# Patient Record
Sex: FEMALE | Race: WHITE | Employment: OTHER | URBAN - METROPOLITAN AREA
[De-identification: names, ages, dates, MRNs, and addresses within clinical notes are randomized per-mention and may not be internally consistent; named-entity substitution may affect disease eponyms.]

---

## 2020-02-24 ENCOUNTER — HOSPITAL ENCOUNTER (EMERGENCY)
Age: 85
Discharge: LEFT BEFORE DISCHARGE | End: 2020-02-24
Attending: EMERGENCY MEDICINE
Payer: MEDICARE

## 2020-02-24 VITALS
DIASTOLIC BLOOD PRESSURE: 94 MMHG | OXYGEN SATURATION: 100 % | SYSTOLIC BLOOD PRESSURE: 153 MMHG | TEMPERATURE: 97.5 F | RESPIRATION RATE: 18 BRPM | WEIGHT: 114.42 LBS | HEART RATE: 76 BPM

## 2020-02-24 DIAGNOSIS — I48.91 ATRIAL FIBRILLATION, UNSPECIFIED TYPE (HCC): Primary | ICD-10-CM

## 2020-02-24 PROCEDURE — 99284 EMERGENCY DEPT VISIT MOD MDM: CPT

## 2020-02-24 PROCEDURE — 93005 ELECTROCARDIOGRAM TRACING: CPT

## 2020-02-25 LAB
ATRIAL RATE: 78 BPM
CALCULATED R AXIS, ECG10: 84 DEGREES
CALCULATED T AXIS, ECG11: -39 DEGREES
DIAGNOSIS, 93000: NORMAL
Q-T INTERVAL, ECG07: 406 MS
QRS DURATION, ECG06: 128 MS
QTC CALCULATION (BEZET), ECG08: 456 MS
VENTRICULAR RATE, ECG03: 76 BPM

## 2020-02-25 NOTE — ED NOTES
Patient very upset stating that she does not want to stay. MD at bedside to explain risks of leaving AMA. Two additional nurses present to witness. Patient still adamant she wants to leave. AMA form explained to patient. Patient refuses to sign. \"I know I could die, but Im not an  and Im not signing. \"

## 2020-02-25 NOTE — DISCHARGE INSTRUCTIONS

## 2020-02-25 NOTE — ED TRIAGE NOTES
Triage Note: Patient arrives with her daughter from [facility] for new onset a-fib and a \"spot\" to her lung. Daughter reports they went to [facility] today for back and left rib pain. Patient uncooperative at this time. Refusing vitals signs and EKG.

## 2020-02-25 NOTE — ED PROVIDER NOTES
Caroline Reynoso is a 69 yo F who was referred from [facility] due to new onset afib. She had gone to [facility] to be evaluated for left lower lateral chest pain that she has had for several weeks. She states that in addition to an  EKG they also did an XR and she was told she had a spot on her lung. She is very upset by what she feels was mistreatment at [facility] because she felt these tests were not necessary as they do not relate to her pain. Patient states that she has been under a lot of stress lately because her  has had multiple myeloma and she has been caring for him. She does not want to be here and does not believe she needs any more testing. PAtient also arguing with her daughter who is at bedside and had brought her to the ED. Patient has not felt heart racing or palpitations           History reviewed. No pertinent past medical history. Past Surgical History:   Procedure Laterality Date    HX APPENDECTOMY      HX TONSIL AND ADENOIDECTOMY           History reviewed. No pertinent family history.     Social History     Socioeconomic History    Marital status:      Spouse name: Not on file    Number of children: Not on file    Years of education: Not on file    Highest education level: Not on file   Occupational History    Not on file   Social Needs    Financial resource strain: Not on file    Food insecurity:     Worry: Not on file     Inability: Not on file    Transportation needs:     Medical: Not on file     Non-medical: Not on file   Tobacco Use    Smoking status: Never Smoker    Smokeless tobacco: Never Used   Substance and Sexual Activity    Alcohol use: Yes     Frequency: Never     Comment: occ    Drug use: Not Currently    Sexual activity: Not on file   Lifestyle    Physical activity:     Days per week: Not on file     Minutes per session: Not on file    Stress: Not on file   Relationships    Social connections:     Talks on phone: Not on file Gets together: Not on file     Attends Mandaen service: Not on file     Active member of club or organization: Not on file     Attends meetings of clubs or organizations: Not on file     Relationship status: Not on file    Intimate partner violence:     Fear of current or ex partner: Not on file     Emotionally abused: Not on file     Physically abused: Not on file     Forced sexual activity: Not on file   Other Topics Concern    Not on file   Social History Narrative    Not on file         ALLERGIES: Patient has no known allergies. Review of Systems   Constitutional: Negative for fever. HENT: Negative for sore throat. Eyes: Negative for visual disturbance. Respiratory: Negative for cough. Cardiovascular: Positive for chest pain (left lateral chest wall). Negative for palpitations. Gastrointestinal: Negative for abdominal pain. Genitourinary: Negative for dysuria. Musculoskeletal: Positive for back pain. Skin: Negative for rash. Neurological: Negative for headaches. Vitals:    02/1935   BP: (!) 153/94   Pulse: 76   Resp: 18   Temp: 97.5 °F (36.4 °C)   SpO2: 100%   Weight: 51.9 kg (114 lb 6.7 oz)            Physical Exam  Vitals signs and nursing note reviewed. Constitutional:       General: She is not in acute distress. Appearance: She is well-developed. HENT:      Head: Normocephalic and atraumatic. Eyes:      Conjunctiva/sclera: Conjunctivae normal.   Neck:      Musculoskeletal: Normal range of motion. Trachea: Phonation normal.   Cardiovascular:      Rate and Rhythm: Normal rate. Rhythm irregular. Pulmonary:      Effort: Pulmonary effort is normal. No respiratory distress. Breath sounds: No wheezing or rales. Chest:      Chest wall: Tenderness (left lateral inferior chest wall) and crepitus present. Abdominal:      General: There is no distension. Musculoskeletal: Normal range of motion. General: No tenderness.    Skin:     General: Skin is warm and dry. Neurological:      Mental Status: She is alert. She is not disoriented. Motor: No abnormal muscle tone. Psychiatric:         Mood and Affect: Affect is angry. MDM     Patient refuses all testing and treatment at this time. Advised patient of risks of cardiac injury, stroke and death if her atrial fibrillation is not addressed. Patient voices understanding of risk. Advised that she can return to the ED at anytime if she changes her mind.     Procedures

## 2020-03-26 ENCOUNTER — HOSPITAL ENCOUNTER (OUTPATIENT)
Dept: MRI IMAGING | Age: 85
Discharge: HOME | End: 2020-03-26
Attending: ORTHOPAEDIC SURGERY
Payer: MEDICARE

## 2020-03-26 DIAGNOSIS — M47.817 LUMBOSACRAL SPONDYLOSIS WITHOUT MYELOPATHY: ICD-10-CM

## 2020-03-26 DIAGNOSIS — M43.10 ACQUIRED SPONDYLOLISTHESIS: ICD-10-CM

## 2020-03-26 DIAGNOSIS — S32.000S COMPRESSION FRACTURE OF LUMBAR VERTEBRA, UNSPECIFIED LUMBAR VERTEBRAL LEVEL, SEQUELA: ICD-10-CM

## 2020-03-26 DIAGNOSIS — M47.814 THORACIC SPONDYLOSIS WITHOUT MYELOPATHY: ICD-10-CM

## 2020-03-26 DIAGNOSIS — S22.000S COMPRESSION FRACTURE OF THORACIC VERTEBRA, UNSPECIFIED THORACIC VERTEBRAL LEVEL, SEQUELA: ICD-10-CM

## 2020-03-26 DIAGNOSIS — M41.50 DEGENERATIVE SCOLIOSIS IN ADULT PATIENT: ICD-10-CM

## 2020-03-26 DIAGNOSIS — M54.50 LOW BACK PAIN, UNSPECIFIED BACK PAIN LATERALITY, UNSPECIFIED CHRONICITY, UNSPECIFIED WHETHER SCIATICA PRESENT: ICD-10-CM

## 2020-03-26 DIAGNOSIS — M54.6 PAIN IN THORACIC SPINE: ICD-10-CM

## 2020-03-26 PROCEDURE — 72146 MRI CHEST SPINE W/O DYE: CPT

## 2020-03-26 PROCEDURE — 72148 MRI LUMBAR SPINE W/O DYE: CPT

## 2022-01-24 ENCOUNTER — HOSPITAL ENCOUNTER (INPATIENT)
Age: 87
LOS: 5 days | Discharge: NURSING, REHAB OR LONG-TERM CARE | DRG: 515 | End: 2022-02-06
Attending: STUDENT IN AN ORGANIZED HEALTH CARE EDUCATION/TRAINING PROGRAM | Admitting: HOSPITALIST
Payer: MEDICARE

## 2022-01-24 ENCOUNTER — APPOINTMENT (OUTPATIENT)
Dept: CT IMAGING | Age: 87
DRG: 515 | End: 2022-01-24
Attending: STUDENT IN AN ORGANIZED HEALTH CARE EDUCATION/TRAINING PROGRAM
Payer: MEDICARE

## 2022-01-24 DIAGNOSIS — I50.9 CONGESTIVE HEART FAILURE, UNSPECIFIED HF CHRONICITY, UNSPECIFIED HEART FAILURE TYPE (HCC): Primary | ICD-10-CM

## 2022-01-24 DIAGNOSIS — S22.000A COMPRESSION FRACTURE OF THORACIC VERTEBRA, UNSPECIFIED THORACIC VERTEBRAL LEVEL, INITIAL ENCOUNTER (HCC): ICD-10-CM

## 2022-01-24 DIAGNOSIS — M54.6 ACUTE THORACIC BACK PAIN, UNSPECIFIED BACK PAIN LATERALITY: ICD-10-CM

## 2022-01-24 LAB
ALBUMIN SERPL-MCNC: 3.8 G/DL (ref 3.5–5)
ALBUMIN/GLOB SERPL: 0.9 {RATIO} (ref 1.1–2.2)
ALP SERPL-CCNC: 136 U/L (ref 45–117)
ALT SERPL-CCNC: 15 U/L (ref 12–78)
ANION GAP SERPL CALC-SCNC: 12 MMOL/L (ref 5–15)
AST SERPL-CCNC: 30 U/L (ref 15–37)
BASOPHILS # BLD: 0 K/UL (ref 0–0.1)
BASOPHILS NFR BLD: 0 % (ref 0–1)
BILIRUB SERPL-MCNC: 1.2 MG/DL (ref 0.2–1)
BNP SERPL-MCNC: 1284 PG/ML (ref 0–450)
BUN SERPL-MCNC: 12 MG/DL (ref 6–20)
BUN/CREAT SERPL: 21 (ref 12–20)
CALCIUM SERPL-MCNC: 9.4 MG/DL (ref 8.5–10.1)
CHLORIDE SERPL-SCNC: 101 MMOL/L (ref 97–108)
CK SERPL-CCNC: 67 U/L (ref 26–192)
CO2 SERPL-SCNC: 28 MMOL/L (ref 21–32)
CREAT SERPL-MCNC: 0.58 MG/DL (ref 0.55–1.02)
DIFFERENTIAL METHOD BLD: ABNORMAL
EOSINOPHIL # BLD: 0.1 K/UL (ref 0–0.4)
EOSINOPHIL NFR BLD: 1 % (ref 0–7)
ERYTHROCYTE [DISTWIDTH] IN BLOOD BY AUTOMATED COUNT: 12.2 % (ref 11.5–14.5)
GLOBULIN SER CALC-MCNC: 4.2 G/DL (ref 2–4)
GLUCOSE SERPL-MCNC: 100 MG/DL (ref 65–100)
HCT VFR BLD AUTO: 48 % (ref 35–47)
HGB BLD-MCNC: 15.7 G/DL (ref 11.5–16)
IMM GRANULOCYTES # BLD AUTO: 0 K/UL (ref 0–0.04)
IMM GRANULOCYTES NFR BLD AUTO: 0 % (ref 0–0.5)
LYMPHOCYTES # BLD: 1 K/UL (ref 0.8–3.5)
LYMPHOCYTES NFR BLD: 10 % (ref 12–49)
MCH RBC QN AUTO: 33.3 PG (ref 26–34)
MCHC RBC AUTO-ENTMCNC: 32.7 G/DL (ref 30–36.5)
MCV RBC AUTO: 101.7 FL (ref 80–99)
MONOCYTES # BLD: 0.6 K/UL (ref 0–1)
MONOCYTES NFR BLD: 5 % (ref 5–13)
NEUTS SEG # BLD: 9.1 K/UL (ref 1.8–8)
NEUTS SEG NFR BLD: 84 % (ref 32–75)
NRBC # BLD: 0 K/UL (ref 0–0.01)
NRBC BLD-RTO: 0 PER 100 WBC
PLATELET # BLD AUTO: 201 K/UL (ref 150–400)
PMV BLD AUTO: 11.4 FL (ref 8.9–12.9)
POTASSIUM SERPL-SCNC: 4.6 MMOL/L (ref 3.5–5.1)
PROT SERPL-MCNC: 8 G/DL (ref 6.4–8.2)
RBC # BLD AUTO: 4.72 M/UL (ref 3.8–5.2)
SODIUM SERPL-SCNC: 141 MMOL/L (ref 136–145)
TROPONIN-HIGH SENSITIVITY: 24 NG/L (ref 0–51)
WBC # BLD AUTO: 10.8 K/UL (ref 3.6–11)

## 2022-01-24 PROCEDURE — 74011000636 HC RX REV CODE- 636: Performed by: STUDENT IN AN ORGANIZED HEALTH CARE EDUCATION/TRAINING PROGRAM

## 2022-01-24 PROCEDURE — 71275 CT ANGIOGRAPHY CHEST: CPT

## 2022-01-24 PROCEDURE — 74177 CT ABD & PELVIS W/CONTRAST: CPT

## 2022-01-24 PROCEDURE — 83880 ASSAY OF NATRIURETIC PEPTIDE: CPT

## 2022-01-24 PROCEDURE — 99285 EMERGENCY DEPT VISIT HI MDM: CPT

## 2022-01-24 PROCEDURE — 80053 COMPREHEN METABOLIC PANEL: CPT

## 2022-01-24 PROCEDURE — 36415 COLL VENOUS BLD VENIPUNCTURE: CPT

## 2022-01-24 PROCEDURE — 93005 ELECTROCARDIOGRAM TRACING: CPT

## 2022-01-24 PROCEDURE — 65270000029 HC RM PRIVATE

## 2022-01-24 PROCEDURE — 65390000012 HC CONDITION CODE 44 OBSERVATION

## 2022-01-24 PROCEDURE — 82550 ASSAY OF CK (CPK): CPT

## 2022-01-24 PROCEDURE — 85025 COMPLETE CBC W/AUTO DIFF WBC: CPT

## 2022-01-24 PROCEDURE — 84484 ASSAY OF TROPONIN QUANT: CPT

## 2022-01-24 PROCEDURE — 94761 N-INVAS EAR/PLS OXIMETRY MLT: CPT

## 2022-01-24 PROCEDURE — 74011250637 HC RX REV CODE- 250/637: Performed by: EMERGENCY MEDICINE

## 2022-01-24 RX ORDER — KETOROLAC TROMETHAMINE 30 MG/ML
15 INJECTION, SOLUTION INTRAMUSCULAR; INTRAVENOUS
Status: DISPENSED | OUTPATIENT
Start: 2022-01-25 | End: 2022-01-25

## 2022-01-24 RX ORDER — ACETAMINOPHEN 500 MG
1000 TABLET ORAL
Status: COMPLETED | OUTPATIENT
Start: 2022-01-25 | End: 2022-01-24

## 2022-01-24 RX ORDER — MORPHINE SULFATE 4 MG/ML
4 INJECTION INTRAVENOUS
Status: DISPENSED | OUTPATIENT
Start: 2022-01-25 | End: 2022-01-25

## 2022-01-24 RX ADMIN — IOPAMIDOL 100 ML: 755 INJECTION, SOLUTION INTRAVENOUS at 19:04

## 2022-01-24 RX ADMIN — ACETAMINOPHEN 500 MG: 500 TABLET ORAL at 23:39

## 2022-01-24 NOTE — ED TRIAGE NOTES
Pt arrived to ED via wheelchair. Pt reports chief complaint of back pain upon arrival. Daughter present in triage room for triage. Pt reports back pain started this am. Daughter volunteers that patient expressed that she was having back pain yesterday and has been complaining of intermittent back pain since November. Upon asking orientation questions during triage, pt gave different birthday that arrived in ED. Daughter and patient report that patient gives different birthdays at times because \"people do not need to know how old she really is. \" Registrar verified patient date of birth with drivers license and chart changed to reflect actual birthday. Pt agitated with triage questions in triage room stating that \"you just think I am crazy and are trying to send me to the [facility] bin. \" RN and daughter reassured patient that RN is trying to help patient and understand patient history and timeline of events that brought her in today. Pt reports she has been taking another family members muscle relaxers prior to coming to ED last night. ED MD made aware.

## 2022-01-24 NOTE — ED PROVIDER NOTES
HPI     Patient is an 44-year-old female who presents today with her daughter for back pain. Daughter says that patient had back pain earlier today. She had difficulty walking and was short of breath. EMS had to help her out of the house. She denies any trauma. Says she has a history of fractures in the past.  She also has been feeling winded and short of breath, worse with taking deep breaths. She was brought in with her daughter for further evaluation. Past Medical History:   Diagnosis Date    Atrial fibrillation, chronic (HCC)     Back pain     Sciatica     Spondylolysis        Past Surgical History:   Procedure Laterality Date    HX APPENDECTOMY      HX TONSIL AND ADENOIDECTOMY           History reviewed. No pertinent family history. Social History     Socioeconomic History    Marital status:      Spouse name: Not on file    Number of children: Not on file    Years of education: Not on file    Highest education level: Not on file   Occupational History    Not on file   Tobacco Use    Smoking status: Never Smoker    Smokeless tobacco: Never Used   Substance and Sexual Activity    Alcohol use: Yes     Comment: occ    Drug use: Not Currently    Sexual activity: Not on file   Other Topics Concern    Not on file   Social History Narrative    Not on file     Social Determinants of Health     Financial Resource Strain:     Difficulty of Paying Living Expenses: Not on file   Food Insecurity:     Worried About Running Out of Food in the Last Year: Not on file    Sandy of Food in the Last Year: Not on file   Transportation Needs:     Lack of Transportation (Medical): Not on file    Lack of Transportation (Non-Medical):  Not on file   Physical Activity:     Days of Exercise per Week: Not on file    Minutes of Exercise per Session: Not on file   Stress:     Feeling of Stress : Not on file   Social Connections:     Frequency of Communication with Friends and Family: Not on file  Frequency of Social Gatherings with Friends and Family: Not on file    Attends Sikh Services: Not on file    Active Member of Clubs or Organizations: Not on file    Attends Club or Organization Meetings: Not on file    Marital Status: Not on file   Intimate Partner Violence:     Fear of Current or Ex-Partner: Not on file    Emotionally Abused: Not on file    Physically Abused: Not on file    Sexually Abused: Not on file   Housing Stability:     Unable to Pay for Housing in the Last Year: Not on file    Number of [place] in the Last Year: Not on file    Unstable Housing in the Last Year: Not on file         ALLERGIES: Patient has no known allergies. Review of Systems   Constitutional: Negative for appetite change and fever. HENT: Negative for congestion and rhinorrhea. Eyes: Negative for discharge and redness. Respiratory: Positive for shortness of breath. Negative for cough. Cardiovascular: Negative for chest pain. Gastrointestinal: Negative for abdominal pain, diarrhea, nausea and vomiting. Genitourinary: Negative for decreased urine volume and dysuria. Musculoskeletal: Positive for back pain. Skin: Negative for rash and wound. Neurological: Negative for seizures and headaches. Hematological: Does not bruise/bleed easily. Psychiatric/Behavioral: Negative for agitation. All other systems reviewed and are negative. Vitals:    01/26/22 0420 01/26/22 0558 01/26/22 0900 01/26/22 1000   BP: (!) 187/96  (!) 166/97    Pulse: 75 61 69 83   Resp: 22  21    Temp: 97.2 °F (36.2 °C)  97.5 °F (36.4 °C)    SpO2: 94%  96%    Weight:       Height:                Physical Exam  Vitals and nursing note reviewed. Constitutional:       General: She is not in acute distress. Appearance: Normal appearance. HENT:      Head: Normocephalic and atraumatic. Nose: Nose normal.      Mouth/Throat:      Mouth: Mucous membranes are moist.      Pharynx: Oropharynx is clear. Eyes:      Extraocular Movements: Extraocular movements intact. Conjunctiva/sclera: Conjunctivae normal.      Pupils: Pupils are equal, round, and reactive to light. Cardiovascular:      Rate and Rhythm: Normal rate and regular rhythm. Pulses: Normal pulses. Heart sounds: Normal heart sounds. No murmur heard. No friction rub. No gallop. Pulmonary:      Effort: Pulmonary effort is normal.      Breath sounds: Normal breath sounds. Comments: Short of breath when taking steps or speaking. Abdominal:      General: Bowel sounds are normal. There is no distension. Palpations: Abdomen is soft. Tenderness: There is no abdominal tenderness. Musculoskeletal:         General: Normal range of motion. Cervical back: Normal range of motion. Comments: No bony tenderness present on the cervical, thoracic, or lumbar spine. Skin:     General: Skin is warm and dry. Capillary Refill: Capillary refill takes less than 2 seconds. Neurological:      General: No focal deficit present. Mental Status: She is alert and oriented to person, place, and time. Mental status is at baseline. Psychiatric:         Mood and Affect: Mood normal.          MDM            MEDICAL DECISION MAKIN y.o. female presents with Back Pain    Differential diagnosis includes but not limited to:  Fracture vs muskoculosketal strain vs chest pain    Perfect [facility] Consult for Admission  8:34 PM    ED Room Number: S637/01  Patient Name and age:  Xiomara Vance 80 y.o.  female  Working Diagnosis:   1. Congestive heart failure, unspecified HF chronicity, unspecified heart failure type ([facility] [address]    2. Acute thoracic back pain, unspecified back pain laterality    3.  Compression fracture of thoracic vertebra, unspecified thoracic vertebral level, initial encounter ([facility] [address]        COVID-19 Suspicion:  no  Sepsis present:  no  Reassessment needed: no  Code Status:  Full Code  Readmission: no  Isolation Requirements: no  Recommended Level of Care:  med/surg  Department:[facility] Adult ED - [phone]  Other: Patient is a an 51-year-old female with history of compression fractures of T11, T12, L4 who presents today for shortness of breath and severe back pain while at home today. On exam, patient is short of breath with exertion and also when communicating with me. Labs reviewed with elevated BNP starting for new heart failure. CTA ordered and reviewed. Cardiomegaly. Multiple thoracic compression fractures, stable. Patient given pain medication while in the ED. Patient is stable for the floor. LABORATORY TESTS:  Labs Reviewed   CBC WITH AUTOMATED DIFF - Abnormal; Notable for the following components:       Result Value    HCT 48.0 (*)     .7 (*)     NEUTROPHILS 84 (*)     LYMPHOCYTES 10 (*)     ABS. NEUTROPHILS 9.1 (*)     All other components within normal limits   METABOLIC PANEL, COMPREHENSIVE - Abnormal; Notable for the following components:    BUN/Creatinine ratio 21 (*)     Bilirubin, total 1.2 (*)     Alk. phosphatase 136 (*)     Globulin 4.2 (*)     A-G Ratio 0.9 (*)     All other components within normal limits   NT-PRO BNP - Abnormal; Notable for the following components:    NT pro-BNP 1,284 (*)     All other components within normal limits   TROPONIN-HIGH SENSITIVITY   CK   TROPONIN-HIGH SENSITIVITY   SAMPLES BEING HELD   TROPONIN-HIGH SENSITIVITY   METABOLIC PANEL, BASIC   CBC WITH AUTOMATED DIFF       IMAGING RESULTS:  CTA CHEST W OR W WO CONT   Final Result      No evidence for aortic dissection. Cardiomegaly   Multiple thoracic compression fractures, stable      CT ABD PELV W CONT   Final Result   No acute abnormality in the abdomen or pelvis.        MRI [facility] SPINE WO CONT    (Results Pending)   CT HEAD WO CONT    (Results Pending)       MEDICATIONS GIVEN:  Medications   morphine injection 4 mg (4 mg IntraVENous Refused 1/25/22 0000)   ketorolac (TORADOL) injection 15 mg (15 mg IntraVENous Refused 1/25/22 0000)   sodium chloride (NS) flush 5-40 mL ( IntraVENous Canceled Entry 1/26/22 0600)   sodium chloride (NS) flush 5-40 mL (has no administration in time range)   acetaminophen (TYLENOL) tablet 650 mg (has no administration in time range)   morphine injection 1 mg (has no administration in time range)   heparin (porcine) injection 5,000 Units (5,000 Units SubCUTAneous Refused 1/26/22 0435)   hydrALAZINE (APRESOLINE) 20 mg/mL injection 10 mg (has no administration in time range)   LORazepam (ATIVAN) injection 0.5 mg (0.5 mg IntraMUSCular Refused 1/25/22 1707)   LORazepam (ATIVAN) injection 1 mg (has no administration in time range)   iopamidoL (ISOVUE-370) 76 % injection 100 mL (100 mL IntraVENous Given 1/24/22 1904)   acetaminophen (TYLENOL) tablet 1,000 mg (500 mg Oral Given 1/24/22 2339)   furosemide (LASIX) injection 20 mg (20 mg IntraVENous Given 1/25/22 1157)   LORazepam (ATIVAN) tablet 1 mg (1 mg Oral Given 1/25/22 1949)     IMPRESSION:  1. Congestive heart failure, unspecified HF chronicity, unspecified heart failure type ([address]    2. Acute thoracic back pain, unspecified back pain laterality    3.  Compression fracture of thoracic vertebra, unspecified thoracic vertebral level, initial encounter [facility])        PLAN:  - Admit to hospitalist    Porsha Lerner MD              Procedures

## 2022-01-25 ENCOUNTER — APPOINTMENT (OUTPATIENT)
Dept: NON INVASIVE DIAGNOSTICS | Age: 87
DRG: 515 | End: 2022-01-25
Attending: FAMILY MEDICINE
Payer: MEDICARE

## 2022-01-25 ENCOUNTER — APPOINTMENT (OUTPATIENT)
Dept: CT IMAGING | Age: 87
DRG: 515 | End: 2022-01-25
Attending: FAMILY MEDICINE
Payer: MEDICARE

## 2022-01-25 LAB
ATRIAL RATE: 72 BPM
CALCULATED R AXIS, ECG10: 100 DEGREES
CALCULATED T AXIS, ECG11: -62 DEGREES
DIAGNOSIS, 93000: NORMAL
Q-T INTERVAL, ECG07: 408 MS
QRS DURATION, ECG06: 134 MS
QTC CALCULATION (BEZET), ECG08: 459 MS
TROPONIN-HIGH SENSITIVITY: 27 NG/L (ref 0–51)
VENTRICULAR RATE, ECG03: 76 BPM

## 2022-01-25 PROCEDURE — 36415 COLL VENOUS BLD VENIPUNCTURE: CPT

## 2022-01-25 PROCEDURE — 84484 ASSAY OF TROPONIN QUANT: CPT

## 2022-01-25 PROCEDURE — 96372 THER/PROPH/DIAG INJ SC/IM: CPT

## 2022-01-25 PROCEDURE — 74011250636 HC RX REV CODE- 250/636: Performed by: FAMILY MEDICINE

## 2022-01-25 PROCEDURE — 94761 N-INVAS EAR/PLS OXIMETRY MLT: CPT

## 2022-01-25 PROCEDURE — 65660000000 HC RM CCU STEPDOWN

## 2022-01-25 PROCEDURE — 94760 N-INVAS EAR/PLS OXIMETRY 1: CPT

## 2022-01-25 PROCEDURE — 65390000012 HC CONDITION CODE 44 OBSERVATION

## 2022-01-25 PROCEDURE — 74011250637 HC RX REV CODE- 250/637: Performed by: FAMILY MEDICINE

## 2022-01-25 PROCEDURE — 96374 THER/PROPH/DIAG INJ IV PUSH: CPT

## 2022-01-25 PROCEDURE — 65210000002 HC RM PRIVATE GYN

## 2022-01-25 PROCEDURE — 93306 TTE W/DOPPLER COMPLETE: CPT

## 2022-01-25 RX ORDER — ACETAMINOPHEN 325 MG/1
650 TABLET ORAL
Status: DISCONTINUED | OUTPATIENT
Start: 2022-01-25 | End: 2022-02-06 | Stop reason: HOSPADM

## 2022-01-25 RX ORDER — LORAZEPAM 1 MG/1
1 TABLET ORAL ONCE
Status: COMPLETED | OUTPATIENT
Start: 2022-01-25 | End: 2022-01-25

## 2022-01-25 RX ORDER — HEPARIN SODIUM 5000 [USP'U]/ML
5000 INJECTION, SOLUTION INTRAVENOUS; SUBCUTANEOUS EVERY 8 HOURS
Status: DISCONTINUED | OUTPATIENT
Start: 2022-01-25 | End: 2022-02-06 | Stop reason: HOSPADM

## 2022-01-25 RX ORDER — SODIUM CHLORIDE 0.9 % (FLUSH) 0.9 %
5-40 SYRINGE (ML) INJECTION EVERY 8 HOURS
Status: DISCONTINUED | OUTPATIENT
Start: 2022-01-25 | End: 2022-02-06 | Stop reason: HOSPADM

## 2022-01-25 RX ORDER — LORAZEPAM 2 MG/ML
0.5 INJECTION INTRAMUSCULAR ONCE
Status: DISPENSED | OUTPATIENT
Start: 2022-01-25 | End: 2022-01-26

## 2022-01-25 RX ORDER — HYDRALAZINE HYDROCHLORIDE 20 MG/ML
10 INJECTION INTRAMUSCULAR; INTRAVENOUS
Status: DISCONTINUED | OUTPATIENT
Start: 2022-01-25 | End: 2022-02-06 | Stop reason: HOSPADM

## 2022-01-25 RX ORDER — SODIUM CHLORIDE 0.9 % (FLUSH) 0.9 %
5-40 SYRINGE (ML) INJECTION AS NEEDED
Status: DISCONTINUED | OUTPATIENT
Start: 2022-01-25 | End: 2022-02-06 | Stop reason: HOSPADM

## 2022-01-25 RX ORDER — FUROSEMIDE 10 MG/ML
20 INJECTION INTRAMUSCULAR; INTRAVENOUS ONCE
Status: COMPLETED | OUTPATIENT
Start: 2022-01-25 | End: 2022-01-25

## 2022-01-25 RX ORDER — MORPHINE SULFATE 2 MG/ML
1 INJECTION, SOLUTION INTRAMUSCULAR; INTRAVENOUS
Status: DISCONTINUED | OUTPATIENT
Start: 2022-01-25 | End: 2022-02-06 | Stop reason: HOSPADM

## 2022-01-25 RX ADMIN — LORAZEPAM 1 MG: 1 TABLET ORAL at 19:49

## 2022-01-25 RX ADMIN — HEPARIN SODIUM 5000 UNITS: 5000 INJECTION INTRAVENOUS; SUBCUTANEOUS at 21:12

## 2022-01-25 RX ADMIN — HEPARIN SODIUM 5000 UNITS: 5000 INJECTION INTRAVENOUS; SUBCUTANEOUS at 11:56

## 2022-01-25 RX ADMIN — FUROSEMIDE 20 MG: 10 INJECTION, SOLUTION INTRAVENOUS at 11:57

## 2022-01-25 NOTE — PROGRESS NOTES
Physical Therapy Note:  Chart reviewed in preparation for evaluation. Patient with c/o back pain with multiple thoracic compression fx identified. Patient currently on bed rest and awaiting IR to evaluate for a potential kyphoplasty. Will defer and follow up once cleared for OOB mobility.   Thank you,  Omar Mohr, PT, DPT

## 2022-01-25 NOTE — CONSULTS
[facility] Cardiology Consultation    Date of Consult:  01/25/22  Date of Admission: 1/24/2022  Primary Cardiologist: none  Physician Requesting consult: Dr. Leanne Batista    Assessment/Plan:  - Possible heart failure - history limited due to likely dementia but daughter noted exertional dyspnea. NT pro BNP elevated. No overt signs of volume overload on exam. CT without pulmonary edema. Will follow up echo. - Atrial fibrillation - rate controlled. Noted on ECG one year ago as well. Daughter unaware of this history. CHADSVASc at least 3 but not a good anticoagulation candidate given her age, frailty, and possible dementia. Discussed this with daughter and she agrees with deferring anticoagulation  - Thoracic compression fractures - could potentially be contributing to her dyspnea as well. Thank you for the opportunity to participate in the care of Kellee Allen and please do not hesitate to contact us should you have any questions. Chief Complaint / Reason for Consult:   Possible HF    History of Present Illness:  Kellee Allen is a 80 y.o. female with the below listed medical history who was admitted with back pain. The patient was unable to provide much of a history. She is alert but likely has dementia. She noted having upper back pain recently. Denies chest pain or dyspnea. Per daughter, patient lives her . She does not go to see doctors regularly but does not like to take medications. EMS was called because she was having severe back pain. She had also noted that she was becoming short of breath with exertion. She does not have know cardiac history. She was seen in ER 2/2020 for atrial fibrillation and had refused testing/treatment for it at the time. Work up notable for elevated NT pro BNP of 1284. Hs Tn 24 --> 27.  ECG with AF, v rate 70s, RBBB, and inferior T wave inversions unchanged from prior    Past Medical History:   Diagnosis Date    Atrial fibrillation, chronic (HCC)     Back pain     Sciatica     Spondylolysis        Prior to Admission medications    Not on File       Current Facility-Administered Medications   Medication Dose Route Frequency    sodium chloride (NS) flush 5-40 mL  5-40 mL IntraVENous Q8H    sodium chloride (NS) flush 5-40 mL  5-40 mL IntraVENous PRN    acetaminophen (TYLENOL) tablet 650 mg  650 mg Oral Q4H PRN    morphine injection 1 mg  1 mg IntraVENous Q4H PRN    heparin (porcine) injection 5,000 Units  5,000 Units SubCUTAneous Q8H    hydrALAZINE (APRESOLINE) 20 mg/mL injection 10 mg  10 mg IntraVENous Q6H PRN       History reviewed. No pertinent family history. Social History     Socioeconomic History    Marital status:      Spouse name: Not on file    Number of children: Not on file    Years of education: Not on file    Highest education level: Not on file   Occupational History    Not on file   Tobacco Use    Smoking status: Never Smoker    Smokeless tobacco: Never Used   Substance and Sexual Activity    Alcohol use: Yes     Comment: occ    Drug use: Not Currently    Sexual activity: Not on file   Other Topics Concern    Not on file   Social History Narrative    Not on file     Social Determinants of Health     Financial Resource Strain:     Difficulty of Paying Living Expenses: Not on file   Food Insecurity:     Worried About Running Out of Food in the Last Year: Not on file    Sandy of Food in the Last Year: Not on file   Transportation Needs:     Lack of Transportation (Medical): Not on file    Lack of Transportation (Non-Medical):  Not on file   Physical Activity:     Days of Exercise per Week: Not on file    Minutes of Exercise per Session: Not on file   Stress:     Feeling of Stress : Not on file   Social Connections:     Frequency of Communication with Friends and Family: Not on file    Frequency of Social Gatherings with Friends and Family: Not on file    Attends Sabianism Services: Not on file   [employer] of Clubs or Organizations: Not on file    Attends Club or Organization Meetings: Not on file    Marital Status: Not on file   Intimate Partner Violence:     Fear of Current or Ex-Partner: Not on file    Emotionally Abused: Not on file    Physically Abused: Not on file    Sexually Abused: Not on file   Housing Stability:     Unable to Pay for Housing in the Last Year: Not on file    Number of [place] crist[place] in the Last Year: Not on file    Unstable Housing in the Last Year: Not on file       ROS  ROS: All other systems reviewed and were negative other than mentioned above.      Visit Vitals  BP (!) 145/77   Pulse 67   Temp 98.1 °F (36.7 °C)   Resp 24   Ht 5' 7\" (1.702 m)   Wt 51.7 kg (114 lb)   SpO2 97%   BMI 17.85 kg/m²       No intake or output data in the 24 hours ending 01/25/22 1441     General: resting comfortably in no distress, elderly, frail  HEENT: sclera anicteric, moist mucous membranes  Neck: supple, no JVD  CV: irregular rate and rhythm, no murmurs, rubs or gallops  Lungs: no respiratory distress, lungs clear to auscultation   Abdomen: soft, non distended, non tender  MSK: no lower extremity edema  Skin: warm to touch  Neuro: alert  Psych: normal mood and affect     Lab Review:  BMP:   Lab Results   Component Value Date/Time     01/24/2022 05:22 PM    K 4.6 01/24/2022 05:22 PM     01/24/2022 05:22 PM    CO2 28 01/24/2022 05:22 PM    AGAP 12 01/24/2022 05:22 PM     01/24/2022 05:22 PM    BUN 12 01/24/2022 05:22 PM    CREA 0.58 01/24/2022 05:22 PM    GFRAA >60 01/24/2022 05:22 PM    GFRNA >60 01/24/2022 05:22 PM        CBC:  Lab Results   Component Value Date/Time    WBC 10.8 01/24/2022 05:22 PM    HGB 15.7 01/24/2022 05:22 PM    HCT 48.0 (H) 01/24/2022 05:22 PM    PLATELET 807 69/72/4682 05:22 PM    .7 (H) 01/24/2022 05:22 PM       All Cardiac Markers in the last 24 hours:    Lab Results   Component Value Date/Time    CPK 67 01/24/2022 05:22 PM    BNPNT 1,284 (H) 01/24/2022 05:22 PM No results found for: CHOL, CHOLPOCT, CHOLX, CHLST, CHOLV, HDL, HDLPOC, HDLP, LDL, LDLCPOC, LDLC, DLDLP, VLDLC, VLDL, TGLX, TRIGL, TRIGP, TGLPOCT, CHHD, CHHDX     Data Review:  ECG tracing personally reviewed: ECG with AF, v rate 70s, RBBB, and inferior T wave inversions unchanged from prior    Echocardiogram: pending      Signed:  Brian Chan, [address] [place], [place] [id number]  01/25/22

## 2022-01-25 NOTE — H&P
[address] [facility] Adult  Hospitalist Group  History and Physical    Date of Service:  1/25/2022  Primary Care Provider: Bertha Mauricio MD  Source of information: The patient    Chief Complaint: Back Pain      History of Presenting Illness:   Marian Kumar is a 80 y.o. female who presents with with back pain,    History was primarily obtained from the patient's daughter, patient is alert, is somewhat confused, suspect underlying dementia, patient apparently presented with back pain that started a few days back, patient reports that she is not sure whether she had a fall or not but as far she remembers she has not had any falls or injuries, patient reports that it is hard for her to walk because she continues to have significant pain, came to the ER yesterday, who was found to have multiple thoracic compression fractures, the daughter reports that she has had history of thoracic compression fractures in the past, the daughter also reports that patient is having some shortness of breath on exertion, no fever chills, has had received vaccine focal    The patient denies any headache, blurry vision, sore throat, trouble swallowing, trouble with speech, chest pain,, cough, fever, chills, N/V/D, abd pain, urinary symptoms, constipation, recent travels, sick contacts, focal or generalized neurological symptoms, falls, injuries, rashes, contact with COVID-19 diagnosed patients, hematemesis, melena, hemoptysis, hematuria, rashes, denies starting any new medications and denies any other concerns or problems besides as mentioned above. REVIEW OF SYSTEMS:  A comprehensive review of systems was negative except for that written in the History of Present Illness.      Past Medical History:   Diagnosis Date    Atrial fibrillation, chronic (HCC)     Back pain     Sciatica     Spondylolysis       Past Surgical History:   Procedure Laterality Date    HX APPENDECTOMY      HX TONSIL AND ADENOIDECTOMY       Prior to Admission medications    Not on File     No Known Allergies   History reviewed. No pertinent family history. Social History:  reports that she has never smoked. She has never used smokeless tobacco. She reports current alcohol use. She reports previous drug use. Family and social history were personally reviewed, all pertinent and relevant details are outlined as above. Objective:     Visit Vitals  BP (!) 152/83   Pulse 62   Temp 97.8 °F (36.6 °C)   Resp 21   SpO2 94%      O2 Device: None (Room air)    PHYSICAL EXAM:   General: Alert x oriented x somewhat confused  HEENT: PEERL, moist mucus membranes  Neck: Supple, no JVD, no meningeal signs  Chest: Clear to auscultation bilaterally   CVS: S1-S2 heard  Abd: Soft, non-tender, non-distended, +bowel sounds   Ext: No clubbing, no cyanosis, no edema  Neuro/Psych: Pleasant mood and affect, moves all 4, strength 5/5 all extremities, cranial nerves could not be tested as patient was not following history      Data Review: All diagnostic labs and studies have been reviewed. Abnormal Labs Reviewed   CBC WITH AUTOMATED DIFF - Abnormal; Notable for the following components:       Result Value    HCT 48.0 (*)     .7 (*)     NEUTROPHILS 84 (*)     LYMPHOCYTES 10 (*)     ABS. NEUTROPHILS 9.1 (*)     All other components within normal limits   METABOLIC PANEL, COMPREHENSIVE - Abnormal; Notable for the following components:    BUN/Creatinine ratio 21 (*)     Bilirubin, total 1.2 (*)     Alk. phosphatase 136 (*)     Globulin 4.2 (*)     A-G Ratio 0.9 (*)     All other components within normal limits   NT-PRO BNP - Abnormal; Notable for the following components:    NT pro-BNP 1,284 (*)     All other components within normal limits       All Micro Results     None          IMAGING:   CTA CHEST W OR W WO CONT   Final Result      No evidence for aortic dissection.    Cardiomegaly   Multiple thoracic compression fractures, stable      CT ABD PELV W CONT   Final Result No acute abnormality in the abdomen or pelvis. ECG/ECHO:    Results for orders placed or performed during the hospital encounter of 01/24/22   EKG, 12 LEAD, INITIAL   Result Value Ref Range    Ventricular Rate 76 BPM    Atrial Rate 72 BPM    QRS Duration 134 ms    Q-T Interval 408 ms    QTC Calculation (Bezet) 459 ms    Calculated R Axis 100 degrees    Calculated T Axis -62 degrees    Diagnosis       Atrial fibrillation  Right bundle branch block  T wave abnormality, consider inferior ischemia  Abnormal ECG  When compared with ECG of 24-FEB-2020 19:38,  No significant change was found          Assessment:   Given the patient's current clinical presentation, there is a high level of concern for decompensation if discharged from the emergency department. Complex decision making was performed, which includes reviewing the patient's available past medical records, laboratory results, and imaging studies. Back pain  Elevated BNP:    Plan:     Patient will be admitted on a telemetry bed, neck pain likely from thoracic vertebral fractures, obtain MRI of the spine, keep patient on bedrest for now, spine surgery consult, pain control, neurovascular checks, any changes in neurological status will mandate emergent intervention, close monitoring and further intervention per hospital course  I am not convinced that patient has decompensated CHF at this time, clinically patient does not appear to be volume overloaded, CTA of the chest does not show any pulmonary edema, Lasix 20 mg IV x1 dose, telemetry monitoring, strict I's and O's, echo and may consider getting a cardiology consult            DVT PROPHYLAXIS: Heparin  FUNCTIONAL STATUS PRIOR TO HOSPITALIZATION: Fully active and ambulatory; able to carry on all self-care without restriction. EARLY MOBILITY ASSESSMENT: Recommend routine ambulation while hospitalized with the assistance of nursing staff  ANTICIPATED DISCHARGE: 24-48 hours.           Signed By: Charla Bell MD     January 25, 2022         Please note that this dictation may have been completed with [employer], the computer voice recognition software. Quite often unanticipated grammatical, syntax, homophones, and other interpretive errors are inadvertently transcribed by the computer software. Please disregard these errors. Please excuse any errors that have escaped final proofreading.

## 2022-01-25 NOTE — ED NOTES
Pt requesting pain medication, MD notified. Pt offered comfort care and repositioning.  Pt daughter at bedside

## 2022-01-25 NOTE — ED NOTES
Hourly rounding completed; pt sleeping with good chest rise and fall. Waiting on admit room assignment. start simbrinza one drop twice a day in the right eye.

## 2022-01-25 NOTE — PROGRESS NOTES
TRANSFER - IN REPORT:    Verbal report received from Mendel Kansas RN(name) on Selin Ewing  being received from [facility](unit) for routine progression of care      Report consisted of patients Situation, Background, Assessment and   Recommendations(SBAR). Information from the following report(s) SBAR, Kardex, ED Summary, [facility] and Cardiac Rhythm A fib was reviewed with the receiving nurse. Opportunity for questions and clarification was provided. Assessment completed upon patients arrival to unit and care assumed.

## 2022-01-25 NOTE — PROGRESS NOTES
1345 Pt refusing to go for CT scan. Pt stating \"why do I need that procedure. I have shown there is nothing wrong with my head. That doctor needs to provide me with an explanation of why I need this procedure before I agree to this needless test.\" Dr. Chelo Temple paged. 1400 Pt continues to refuse CT scan. Pt refusing to answer MRI screening questions. Left VM for daughter. 1515 Pt attempting to climb out of bed, belligerently yelling and attempting to kick and hit staff. Bed alarm placed. Will continue to monitor.

## 2022-01-25 NOTE — NURSE NAVIGATOR
Chart reviewed by Heart Failure Nurse Navigator. Heart Failure database completed. EF:      ACEi/ARB/ARNi:     BB:     Aldosterone Antagonist:     Obstructive Sleep Apnea Screening: screening priority 4, advanced age   STOP-BANG score:   Referred to Sleep Medicine:     CRT not currently indicated. NYHA Functional Class documentation requested once definitive HF diagnosis ascertained     Heart Failure Teach Back in Patient Education. Heart Failure Avoiding Triggers on Discharge Instructions. Cardiologist: not yet consulted      Post discharge follow up phone call to be made within 48-72 hours of discharge.

## 2022-01-25 NOTE — CONSULTS
65UT with complaint of back pain. CT of Chest shows multiple thoracic compression fractures, age indeterminate, no spinal canal compromise. Recommend MRI and interventional radiology consult for kyphoplasty. Will be available as needed.

## 2022-01-25 NOTE — PROGRESS NOTES
1630- pt very agitated and screaming ,pulled IV out and telemetry -non compliant -Ativan IM ordered  1950- pt still refusing IV insertion -OOB -  Bedside shift change report given to [address] (oncoming nurse) by EVETTE Burns (offgoing nurse). Report included the following information SBAR, Kardex, Procedure Summary, MAR and Recent Results.

## 2022-01-26 ENCOUNTER — APPOINTMENT (OUTPATIENT)
Dept: CT IMAGING | Age: 87
DRG: 515 | End: 2022-01-26
Attending: FAMILY MEDICINE
Payer: MEDICARE

## 2022-01-26 PROCEDURE — 94760 N-INVAS EAR/PLS OXIMETRY 1: CPT

## 2022-01-26 PROCEDURE — 70450 CT HEAD/BRAIN W/O DYE: CPT

## 2022-01-26 PROCEDURE — 96372 THER/PROPH/DIAG INJ SC/IM: CPT

## 2022-01-26 PROCEDURE — 65210000002 HC RM PRIVATE GYN

## 2022-01-26 PROCEDURE — 96375 TX/PRO/DX INJ NEW DRUG ADDON: CPT

## 2022-01-26 PROCEDURE — 74011250636 HC RX REV CODE- 250/636: Performed by: FAMILY MEDICINE

## 2022-01-26 PROCEDURE — 65390000012 HC CONDITION CODE 44 OBSERVATION

## 2022-01-26 PROCEDURE — 65660000000 HC RM CCU STEPDOWN

## 2022-01-26 PROCEDURE — 94761 N-INVAS EAR/PLS OXIMETRY MLT: CPT

## 2022-01-26 PROCEDURE — 74011000250 HC RX REV CODE- 250: Performed by: FAMILY MEDICINE

## 2022-01-26 RX ORDER — LORAZEPAM 2 MG/ML
1 INJECTION INTRAMUSCULAR ONCE
Status: ACTIVE | OUTPATIENT
Start: 2022-01-26 | End: 2022-01-26

## 2022-01-26 RX ADMIN — SODIUM CHLORIDE, PRESERVATIVE FREE 10 ML: 5 INJECTION INTRAVENOUS at 21:07

## 2022-01-26 RX ADMIN — HEPARIN SODIUM 5000 UNITS: 5000 INJECTION INTRAVENOUS; SUBCUTANEOUS at 12:15

## 2022-01-26 RX ADMIN — MORPHINE SULFATE 1 MG: 2 INJECTION, SOLUTION INTRAMUSCULAR; INTRAVENOUS at 16:01

## 2022-01-26 RX ADMIN — HEPARIN SODIUM 5000 UNITS: 5000 INJECTION INTRAVENOUS; SUBCUTANEOUS at 21:06

## 2022-01-26 NOTE — PROGRESS NOTES
Physical Therapy Note:  Chart reviewed in preparation for evaluation. Patient with c/o back pain with multiple thoracic compression fx identified. Patient currently on bed rest and awaiting IR to evaluate for a potential kyphoplasty. Will defer and follow up once cleared for OOB mobility.   Thank you,  Altaf Evans, PT, DPT

## 2022-01-26 NOTE — PROGRESS NOTES
Comprehensive Nutrition Assessment    Type and Reason for Visit: Initial (Low BMI)    Nutrition Recommendations/Plan:      1. Will liberalize diet order to 2gm Na+     2. RD will order Ensure Enlive (high calorie, high protein) chocolate TID      Nutrition Assessment:    81 yo female admitted for CHF. PMhx: Afib, back pain, spondylolysis, suspected underlying dementia. Found to have multiple thoracic compression fractures. No surgery plan indicated at this time. Underweight per BMI with visible muscle wasting and SQ fat loss. Pt states she has always been thin and unable to gain weight but she denies any weight loss. Weight hx in EMR confirms stable weight. Pt states she has a fair appetite at home, eats 3 meals/day plus 3 Ensure shakes or milk mixed with protein powder. Assisted pt with eating breakfast, able to feed herself but needed help/encouragement to set up and start eating. Labs reviewed. Malnutrition Assessment:  Malnutrition Status:  Severe malnutrition    Context:  Chronic illness     Findings of the 6 clinical characteristics of malnutrition:   Energy Intake:  No significant decrease in energy intake  Weight Loss:  No significant weight loss     Body Fat Loss:  7 - Severe body fat loss, Buccal region,Orbital   Muscle Mass Loss:  7 - Severe muscle mass loss, Temples (temporalis),Hand (interosseous),Clavicles (pectoralis &deltoids)  Fluid Accumulation:  No significant fluid accumulation,     Strength:  Not performed       Nutritionally Significant Medications: reviewed    Estimated Daily Nutrient Needs:  Energy (kcal): 8237-1634 (33-36 kcals/kg); Weight Used for Energy Requirements: Current  Protein (g): 73-83 (1.4-1.6 gm/kg);  Weight Used for Protein Requirements: Current  Fluid (ml/day): 1052-3337; Method Used for Fluid Requirements: 1 ml/kcal    Nutrition Related Findings:       BM: PTA  Edema: none  Wounds:  None       Current Nutrition Therapies:   Diet: 4 Carb/Low Fat/Low Chol/2 gm Na+/high fiber diet  Supplements: none currently ordered  Additional Caloric Sources: none    Meal intake: No data found. Supplement Intake: No data found.     Anthropometric Measures:  · Height:  5' 7\" (170.2 cm)  · Current Body Wt:  51.7 kg (113 lb 15.7 oz)   · Admission Body Wt:       · Usual Body Wt:        · Ideal Body Wt:  135:  84.4 %   · Adjusted Body Weight:   ; Weight Adjustment for: No adjustment   · Adjusted BMI:       · BMI Categories:  Underweight (BMI less than 22) age over 72     Wt Readings from Last 10 Encounters:   01/25/22 51.7 kg (114 lb)   02/24/20 51.9 kg (114 lb 6.7 oz)       Nutrition Diagnosis:   · Underweight related to inadequate protein-energy intake as evidenced by BMI (17.9)      Nutrition Interventions:   Food and/or Nutrient Delivery: Continue current diet,Start oral nutrition supplement  Nutrition Education and Counseling: No recommendations at this time  Coordination of Nutrition Care: Continue to monitor while inpatient    Goals:  Consume > 50% meals + 2-3 ONS each day to promote weight gain of 0.5-1lb within next 5-7 days       Nutrition Monitoring and Evaluation:   Behavioral-Environmental Outcomes: None identified  Food/Nutrient Intake Outcomes: Food and nutrient intake,Supplement intake  Physical Signs/Symptoms Outcomes: Biochemical data,GI status,Weight    Discharge Planning:    Continue current diet,Continue oral nutrition supplement     Nayla Rodriguez RD  Contact via [employer]

## 2022-01-26 NOTE — PROGRESS NOTES
Pt's vital signs obtained & repositioned, afterwards became increasingly agitated stating, \"leave me alone, do not call my name. \" Pt still refusing ID wristband & IV insertion at this time. Will continue to monitor.

## 2022-01-26 NOTE — PROGRESS NOTES
Bedside and Verbal shift change report given to Kayla Vaca RN (oncoming nurse) by Giuliana Noyola RN (offgoing nurse). Report included the following information SBAR, Kardex, ED Summary, Procedure Summary, Intake/Output, MAR, Recent Results and Cardiac Rhythm A fib.

## 2022-01-26 NOTE — PROGRESS NOTES
0992: Pts BP elevated but unable to give PRN Hydralazine. Pt has no IV access. Pt refusing to allow RN to place new IV. Pt screamed \"This is a torture chamber\" Explained to pt if she allows me to place IV I can give her pain medications. Pt still refusing. MD notified. Will try again soon. 1110: MD Gómez spoke with pt. Pt is now corporative and allowed RN to place IV access. Will hold off on Ativan since pt is calm and corporative. 1545: Pts son Andrea Walls called for update. Son is not listed in contact list. Tamara Abdullahi, at bedside and said it was ok to speak to son and give an update.

## 2022-01-26 NOTE — PROGRESS NOTES
Cardiology Note:    - Echo noted preserved biventricular function with severe biatrial enlargement, moderate aortic and mitral insufficiency. - She likely chronic HFpEF. She appears well compensated from HF right now  - See previous note re atrial fibrillation  - Recommend follow up with me in clinic at discharge    Please contact us if you have if you have any questions or concerns. Maya Allen.  Michell Vann, [address]  01/26/22

## 2022-01-26 NOTE — PROGRESS NOTES
[address] [facility] Adult  Hospitalist Group                                                                                          Hospitalist Progress Note  Gema Castañeda MD  Answering service: [phone] from in house phone        Date of Service:  2022  NAME:  Zee Ingram  :  1935  MRN:  [id number]      Admission Summary:   History was primarily obtained from the patient's daughter, patient is alert, is somewhat confused, suspect underlying dementia, patient apparently presented with back pain that started a few days back, patient reports that she is not sure whether she had a fall or not but as far she remembers she has not had any falls or injuries, patient reports that it is hard for her to walk because she continues to have significant pain, came to the ER yesterday, who was found to have multiple thoracic compression fractures, the daughter reports that she has had history of thoracic compression fractures in the past, the daughter also reports that patient is having some shortness of breath on exertion, no fever chills, has had received vaccine focal    Interval history / Subjective:     No acute events overnight. Patient seen and examined at bedside. She feels fine and has no acute complaints or concerns. She denies any abdominal or back pain. She states she was ambulatory prior to presenting to the hospital.  Inquiring about thoracic compression fractures and she reports these are chronic. Denies any recent injury. Assessment & Plan:       Thoracic compression fractures  Upper and lower back pain  -Evaluated by neurosurgery. No indication for acute neurosurgery at this time.  -Obtain MRI of thoracic spine  -IR evaluation for potential kyphoplasty  -PT OT to evaluate patient  -If ambulatory and pain-free, may not need kyphoplasty as this is chronic. CHF  -Euvolemic and not acutely decompensated. -Can follow with cardiology outpatient.     Code status: full code  DVT prophylaxis: heparin    Care Plan discussed with: Patient/Family  Anticipated Disposition: Home w/Family  Anticipated Discharge: 24 hours to 48 hours     Hospital Problems  Never Reviewed          Codes Class Noted POA    CHF (congestive heart failure) (HCC) ICD-10-CM: I50.9  ICD-9-CM: 428.0  1/24/2022 Unknown                Review of Systems:   A comprehensive review of systems was negative except for that written in the HPI. Vital Signs:    Last 24hrs VS reviewed since prior progress note. Most recent are:  Visit Vitals  BP (!) 156/84 (BP 1 Location: Right upper arm, BP Patient Position: At rest)   Pulse 77   Temp 97.8 °F (36.6 °C)   Resp 30   Ht 5' 7\" (1.702 m)   Wt 51.7 kg (114 lb)   SpO2 95%   BMI 17.85 kg/m²         Intake/Output Summary (Last 24 hours) at 1/26/2022 1431  Last data filed at 1/25/2022 1547  Gross per 24 hour   Intake --   Output 1000 ml   Net -1000 ml        Physical Examination:     I had a face to face encounter with this patient and independently examined them on 1/26/2022 as outlined below:          Constitutional:  No acute distress, cooperative, pleasant    ENT:  Oral mucosa moist, oropharynx benign. Resp:  CTA bilaterally. No wheezing/rhonchi/rales. No accessory muscle use   CV:  Regular rhythm, normal rate, no murmurs, gallops, rubs    GI:  Soft, non distended, non tender. normoactive bowel sounds, no hepatosplenomegaly     Musculoskeletal:  No edema, warm, 2+ pulses throughout    Neurologic:  Moves all extremities.   AAOx3, CN II-XII reviewed        Data Review:    Review and/or order of clinical lab test      Labs:     Recent Labs     01/24/22  1722   WBC 10.8   HGB 15.7   HCT 48.0*        Recent Labs     01/24/22  1722      K 4.6      CO2 28   BUN 12   CREA 0.58      CA 9.4     Recent Labs     01/24/22  1722   ALT 15   *   TBILI 1.2*   TP 8.0   ALB 3.8   GLOB 4.2*     No results for input(s): INR, PTP, APTT, INREXT in the last 72 hours. No results for input(s): FE, TIBC, PSAT, FERR in the last 72 hours. No results found for: FOL, RBCF   No results for input(s): PH, PCO2, PO2 in the last 72 hours.   Recent Labs     01/24/22  1722   CPK 67     No results found for: CHOL, CHOLX, CHLST, CHOLV, HDL, HDLP, LDL, LDLC, DLDLP, TGLX, TRIGL, TRIGP, CHHD, CHHDX  No results found for: GLUCPOC  No results found for: COLOR, APPRN, SPGRU, REFSG, MARIO, PROTU, GLUCU, KETU, BILU, UROU, ALLAN, LEUKU, GLUKE, EPSU, BACTU, WBCU, RBCU, CASTS, UCRY      Medications Reviewed:     Current Facility-Administered Medications   Medication Dose Route Frequency    LORazepam (ATIVAN) injection 1 mg  1 mg IntraMUSCular ONCE    sodium chloride (NS) flush 5-40 mL  5-40 mL IntraVENous Q8H    sodium chloride (NS) flush 5-40 mL  5-40 mL IntraVENous PRN    acetaminophen (TYLENOL) tablet 650 mg  650 mg Oral Q4H PRN    morphine injection 1 mg  1 mg IntraVENous Q4H PRN    heparin (porcine) injection 5,000 Units  5,000 Units SubCUTAneous Q8H    hydrALAZINE (APRESOLINE) 20 mg/mL injection 10 mg  10 mg IntraVENous Q6H PRN     ______________________________________________________________________  EXPECTED LENGTH OF STAY: 3d 12h  ACTUAL LENGTH OF STAY:          2                 Elizabeth Baird MD

## 2022-01-26 NOTE — PROGRESS NOTES
Reason for Admission:  Back pain                     RUR Score:     13% Low                Plan for utilizing home health:     TBD-PT consulted     PCP: First and Last name:  Amish Narayanan MD     Name of Practice:    Are you a current patient: Yes/No:    Approximate date of last visit:    Can you participate in a virtual visit with your PCP:                   *Patient prefers to use [facility] as PCP*  Current Advanced Directive/Advance Care Plan: Full Code      Healthcare Decision Maker:  NA  Click here to complete [address] including selection of the Healthcare Decision Maker Relationship (ie \"Primary\")                             Transition of Care Plan:      CM met with patient to inform of CM role and to assess needs. Patient lives at home alone but reports that family is in and out. Her daughter Renae Tillman lives a couple of blocks away. She also states she has help and support from neighbors. Patient owns a walker but no other DME. She believes her pharmacy is [facility]. She lives in a three story home but her bedroom is on the second floor. Patient has no home health or rehab history. Patient reports that she independent with self-care and ADLs-she drives. CM to monitor for any transitional care planning needs. Medicare pt has received, reviewed, and signed 1st IM letter informing them of their right to appeal the discharge. Signed copy has been placed on pt bedside chart.       Margo Moreno MS

## 2022-01-26 NOTE — PROGRESS NOTES
Pt's 0400 vitals obtained, BP is 187/96, but I cannot give PRN IV hydralazine as she has no IV access & is absolutely refusing that we attempt to insert a new IV. Pt becoming increasingly agitated again, refused IV insertion for the second time tonight, as well as her subq heparin & morning lab draws. Nadya Solis NP made aware.

## 2022-01-27 ENCOUNTER — APPOINTMENT (OUTPATIENT)
Dept: MRI IMAGING | Age: 87
DRG: 515 | End: 2022-01-27
Attending: HOSPITALIST
Payer: MEDICARE

## 2022-01-27 PROCEDURE — 72146 MRI CHEST SPINE W/O DYE: CPT

## 2022-01-27 PROCEDURE — 96376 TX/PRO/DX INJ SAME DRUG ADON: CPT

## 2022-01-27 PROCEDURE — 97161 PT EVAL LOW COMPLEX 20 MIN: CPT

## 2022-01-27 PROCEDURE — 65660000000 HC RM CCU STEPDOWN

## 2022-01-27 PROCEDURE — 99221 1ST HOSP IP/OBS SF/LOW 40: CPT | Performed by: PSYCHIATRY & NEUROLOGY

## 2022-01-27 PROCEDURE — 97530 THERAPEUTIC ACTIVITIES: CPT

## 2022-01-27 PROCEDURE — 74011000250 HC RX REV CODE- 250: Performed by: HOSPITALIST

## 2022-01-27 PROCEDURE — 74011250637 HC RX REV CODE- 250/637: Performed by: HOSPITALIST

## 2022-01-27 PROCEDURE — 65390000012 HC CONDITION CODE 44 OBSERVATION

## 2022-01-27 PROCEDURE — 74011250636 HC RX REV CODE- 250/636: Performed by: FAMILY MEDICINE

## 2022-01-27 PROCEDURE — 94761 N-INVAS EAR/PLS OXIMETRY MLT: CPT

## 2022-01-27 PROCEDURE — 65210000002 HC RM PRIVATE GYN

## 2022-01-27 RX ORDER — TRAMADOL HYDROCHLORIDE 50 MG/1
50 TABLET ORAL EVERY 8 HOURS
Status: DISCONTINUED | OUTPATIENT
Start: 2022-01-27 | End: 2022-01-28

## 2022-01-27 RX ORDER — ACETAMINOPHEN 325 MG/1
650 TABLET ORAL EVERY 8 HOURS
Status: DISCONTINUED | OUTPATIENT
Start: 2022-01-27 | End: 2022-02-06 | Stop reason: HOSPADM

## 2022-01-27 RX ORDER — LIDOCAINE 4 G/100G
2 PATCH TOPICAL EVERY 24 HOURS
Status: DISCONTINUED | OUTPATIENT
Start: 2022-01-27 | End: 2022-02-06 | Stop reason: HOSPADM

## 2022-01-27 RX ORDER — OXYCODONE HYDROCHLORIDE 5 MG/1
5 TABLET ORAL EVERY 8 HOURS
Status: DISCONTINUED | OUTPATIENT
Start: 2022-01-27 | End: 2022-01-27

## 2022-01-27 RX ADMIN — ACETAMINOPHEN 650 MG: 325 TABLET ORAL at 09:37

## 2022-01-27 RX ADMIN — MORPHINE SULFATE 1 MG: 2 INJECTION, SOLUTION INTRAMUSCULAR; INTRAVENOUS at 08:21

## 2022-01-27 RX ADMIN — OXYCODONE HYDROCHLORIDE 5 MG: 5 TABLET ORAL at 09:37

## 2022-01-27 NOTE — PROGRESS NOTES
Pt increasingly agitated & verbally aggressive when I went in with PCT to get vital signs & readjust her. Pt refusing to get pulled up in bed, stating this RN is \"hateful & needs a new line of work. \" PCT able to get a BP, but not a temperature at this time. Will continue to monitor.

## 2022-01-27 NOTE — PROGRESS NOTES
[address] [facility] Adult  Hospitalist Group                                                                                          Hospitalist Progress Note  Jessie Bates MD  Answering service: [phone] OR [phone] from in house phone        Date of Service:  2022  NAME:  Maria Luz Vences  :  1935  MRN:  [id number]      Admission Summary:   History was primarily obtained from the patient's daughter, patient is alert, is somewhat confused, suspect underlying dementia, patient apparently presented with back pain that started a few days back, patient reports that she is not sure whether she had a fall or not but as far she remembers she has not had any falls or injuries, patient reports that it is hard for her to walk because she continues to have significant pain, came to the ER yesterday, who was found to have multiple thoracic compression fractures, the daughter reports that she has had history of thoracic compression fractures in the past, the daughter also reports that patient is having some shortness of breath on exertion, no fever chills, has had received vaccine focal    Interval history / Subjective:     No acute events overnight. Patient seen and examined at bedside. Back pain improved  Have poor insight on her safely and living situation   Spoke with daughter at bedside and patient's  on phone  Both have concern about patient's safety and underlying cognitive impairment which  told me he has noticed over past 2 years. Assessment & Plan:       Thoracic compression fractures  Upper and lower back pain  -Evaluated by neurosurgery. No indication for acute neurosurgery at this time. - pain resolved   -PT OT to evaluate patient  - pain control     CHF  -Euvolemic and not acutely decompensated. -Can follow with cardiology outpatient.     Possible moderately advanced Dementia  - Neuro eval request  - not safe for discharge home, PT recommends SNF, will get OT eval    Code status: full code  DVT prophylaxis: heparin    Care Plan discussed with: Patient/Family  Anticipated Disposition: Home w/Family  Anticipated Discharge: 24 hours to 48 hours     Hospital Problems  Never Reviewed          Codes Class Noted POA    CHF (congestive heart failure) ([facility] [address] ICD-10-CM: I50.9  ICD-9-CM: 428.0  1/24/2022 Unknown                Review of Systems:   A comprehensive review of systems was negative except for that written in the HPI. Vital Signs:    Last 24hrs VS reviewed since prior progress note. Most recent are:  Visit Vitals  /63 (BP 1 Location: Left upper arm, BP Patient Position: At rest)   Pulse 69   Temp 97.4 °F (36.3 °C)   Resp 23   Ht 5' 7\" (1.702 m)   Wt 51.7 kg (114 lb)   SpO2 95%   BMI 17.85 kg/m²         Intake/Output Summary (Last 24 hours) at 1/27/2022 1701  Last data filed at 1/26/2022 1705  Gross per 24 hour   Intake --   Output 500 ml   Net -500 ml        Physical Examination:     I had a face to face encounter with this patient and independently examined them on 1/27/2022 as outlined below:          Constitutional:  No acute distress, cooperative, pleasant    ENT:  Oral mucosa moist, oropharynx benign. Resp:  CTA bilaterally. CV:  Regular rhythm, normal rate, no murmurs, gallops, rubs    GI:  Soft, non distended, non tender. normoactive bowel sounds,    Musculoskeletal:  No edema, warm, 2+ pulses throughout    Neurologic:  Alert and awake, move all 4        Data Review:    Review and/or order of clinical lab test      Labs:     Recent Labs     01/24/22  1722   WBC 10.8   HGB 15.7   HCT 48.0*        Recent Labs     01/24/22  1722      K 4.6      CO2 28   BUN 12   CREA 0.58      CA 9.4     Recent Labs     01/24/22  1722   ALT 15   *   TBILI 1.2*   TP 8.0   ALB 3.8   GLOB 4.2*     No results for input(s): INR, PTP, APTT, INREXT, INREXT in the last 72 hours.    No results for input(s): FE, TIBC, PSAT, FERR in the last 72 hours. No results found for: FOL, RBCF   No results for input(s): PH, PCO2, PO2 in the last 72 hours.   Recent Labs     01/24/22  1722   CPK 67     No results found for: CHOL, CHOLX, CHLST, CHOLV, HDL, HDLP, LDL, LDLC, DLDLP, TGLX, TRIGL, TRIGP, CHHD, CHHDX  No results found for: GLUCPOC  No results found for: COLOR, APPRN, SPGRU, REFSG, MARIO, PROTU, GLUCU, KETU, BILU, UROU, ALLAN, LEUKU, GLUKE, EPSU, BACTU, WBCU, RBCU, CASTS, UCRY      Medications Reviewed:     Current Facility-Administered Medications   Medication Dose Route Frequency    oxyCODONE IR (ROXICODONE) tablet 5 mg  5 mg Oral Q8H    acetaminophen (TYLENOL) tablet 650 mg  650 mg Oral Q8H    lidocaine 4 % patch 2 Patch  2 Patch TransDERmal Q24H    sodium chloride (NS) flush 5-40 mL  5-40 mL IntraVENous Q8H    sodium chloride (NS) flush 5-40 mL  5-40 mL IntraVENous PRN    acetaminophen (TYLENOL) tablet 650 mg  650 mg Oral Q4H PRN    morphine injection 1 mg  1 mg IntraVENous Q4H PRN    heparin (porcine) injection 5,000 Units  5,000 Units SubCUTAneous Q8H    hydrALAZINE (APRESOLINE) 20 mg/mL injection 10 mg  10 mg IntraVENous Q6H PRN     ______________________________________________________________________  EXPECTED LENGTH OF STAY: 3d 12h  ACTUAL LENGTH OF STAY:          3                 Ascencion Latham MD

## 2022-01-27 NOTE — PROGRESS NOTES
0820: Pt screaming out that she's in pain. Went to assess pt. Pt stated her pain is a 10/10 and then stated \"This is a hospital I need a doctor not a nurse like you. \" PRN morphine was given. 1450: Pt is refusing scheduled Tylenol and Oxy. Pt stated \"I do not trust y'all and I do not want this. \" MD notified. 1944: Shift report at bedside given to Minerva Kidd [facility].

## 2022-01-27 NOTE — PROGRESS NOTES
Problem: Mobility Impaired (Adult and Pediatric)  Goal: *Acute Goals and Plan of Care (Insert Text)  Outcome: Progressing Towards Goal     Problem: Mobility Impaired (Adult and Pediatric)  Goal: *Acute Goals and Plan of Care (Insert Text)  Description: FUNCTIONAL STATUS PRIOR TO ADMISSION: Patient is a poor historian. Reports independence with mobility. HOME SUPPORT PRIOR TO ADMISSION: The patient lived alone with a local daughter who checked in regularly. Patient typically independent for ADLs. Physical Therapy Goals  Initiated 1/27/2022  1. Patient will move from supine to sit and sit to supine  in bed with moderate assistance  within 7 day(s). 2.  Patient will transfer from bed to chair and chair to bed with moderate assistance  using the least restrictive device within 7 day(s). 3.  Patient will perform sit to stand with moderate assistance  within 7 day(s). 4.  Patient will ambulate with moderate assistance  for 50 feet with the least restrictive device within 7 day(s). Outcome: Progressing Towards Goal    PHYSICAL THERAPY EVALUATION  Patient: Young King (89 y.o. female)  Date: 1/27/2022  Primary Diagnosis: CHF (congestive heart failure) ([facility]) [I50.9]        Precautions:        ASSESSMENT  Based on the objective data described below, the patient presents with confusion, impaired command following, limited attention/concentration, poor initiation, and no sitting balance. Patient received in left sidelying and agreeable to activity. 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 Patient chose to rest in awkward positions laying on her shoulder or with her LEs off of the side of the bed in sidelying. Encouraged log roll to sit EOB but patient unable to initiate and required maximum assist to clear her shoulders. Noted minimal to no righting reactions in sitting and she would return to left sidelying without maximum assistance.  Total assist required to scoot to EOB. In sitting, she c/o nausea and chose to rest with her face in the emesis basin while laying on her left shoulder. Completed a 2nd attempt to sit unsupported but with NO sitting balance. Returned to supine and patient actively resisted log rolling to supine. Repositioned with nursing assistance and left in NAD. Cognition and balance were primary limiting factors in progress during evaluation. Back pain was not mentioned by the patient unless specifically questioned. She typically lives alone but is not safe to be alone. Concern for her ability to benefit from rehab without improvement in cognition. Would recommend discharge to SNF for a therapy trial vs LTC without improvement. Current Level of Function Impacting Discharge (mobility/balance): max-total assist for bed mobility       Patient will benefit from skilled therapy intervention to address the above noted impairments. PLAN :  Recommendations and Planned Interventions: bed mobility training, transfer training, gait training, therapeutic exercises, neuromuscular re-education, and therapeutic activities      Frequency/Duration: Patient will be followed by physical therapy:  4 times a week to address goals. Recommendation for discharge: (in order for the patient to meet his/her long term goals)  Therapy up to 5 days/week in SNF setting      IF patient discharges home will need the following DME: to be determined (TBD)         SUBJECTIVE:   Patient stated I feel like i'm going to throw up.     OBJECTIVE DATA SUMMARY:   HISTORY:    Past Medical History:   Diagnosis Date    Atrial fibrillation, chronic (HCC)     Back pain     Sciatica     Spondylolysis      Past Surgical History:   Procedure Laterality Date    HX APPENDECTOMY      HX TONSIL AND ADENOIDECTOMY         Personal factors and/or comorbidities impacting plan of care:     Home Situation  Home Environment: Private residence  One/Two Story Residence:  (3 story and alba's bedroom on 2nd)  Living Alone: Yes  Support Systems: Child(catherine),Friend/Neighbor  Patient Expects to be Discharged to[de-identified]  (TBD)  Current DME Used/Available at Home: Walker, rolling    EXAMINATION/PRESENTATION/DECISION MAKING:   Critical Behavior:  Neurologic State: Confused  Orientation Level: Oriented to person,Oriented to time,Disoriented to situation,Disoriented to place  Cognition: Decreased attention/concentration,Decreased command following     Hearing: Auditory  Auditory Impairment: None     Functional Mobility:  Bed Mobility:  Rolling: Total assistance  Supine to Sit: Maximum assistance     Scooting: Total assistance  Transfers:                             Balance:   Sitting: Impaired  Sitting - Static: Poor (constant support)  Sitting - Dynamic: Poor (constant support) (none)  Ambulation/Gait Training:         Physical Therapy Evaluation Charge Determination   History Examination Presentation Decision-Making   MEDIUM  Complexity : 1-2 comorbidities / personal factors will impact the outcome/ POC  MEDIUM Complexity : 3 Standardized tests and measures addressing body structure, function, activity limitation and / or participation in recreation  MEDIUM Complexity : Evolving with changing characteristics  MEDIUM Complexity : FOTO score of 26-74      Based on the above components, the patient evaluation is determined to be of the following complexity level: MEDIUM    Pain Rating:      Activity Tolerance:   Poor    After treatment patient left in no apparent distress:   Supine in bed and Call bell within reach    COMMUNICATION/EDUCATION:   The patients plan of care was discussed with: Registered nurse. Fall prevention education was provided and the patient/caregiver indicated understanding., Patient/family have participated as able in goal setting and plan of care. , and Patient/family agree to work toward stated goals and plan of care.     Thank you for this referral.  Oneyda Mehta, PT, DPT   Time Calculation: 26 mins

## 2022-01-27 NOTE — PROGRESS NOTES
Bedside and Verbal shift change report given to Soledad Greene RN (oncoming nurse) by Rosi Hernandez RN (offgoing nurse). Report included the following information SBAR, Kardex, ED Summary, Procedure Summary, Intake/Output, MAR, Recent Results and Cardiac Rhythm A fib.

## 2022-01-27 NOTE — CONSULTS
NEUROLOGY   INPATIENT EVALUATION/CONSULTATION       PATIENT NAME: Franco White    MRN: [id number]    REASON FOR CONSULTATION: Progressive cognitive changes, agitation    01/27/22      HISTORY OF PRESENT ILLNESS:    Franco White is a 80 y.o. female admitted for worsening mentation as well as symptoms ultimately found secondary to systolic cardiomyopathy on whom neurology consulted for worsening mentation. Details are not excessively detailed in the chart and I did not reach out to family but essentially patient has had progressive cognitive decline for some time. According to bedside staff patient and her  are legally/physically  from each other though patient continues to violate these boundaries. She generally does not appear to be able to keep anything more than a momentary thought it is also noted to have intermittent agitation more in the evening. No family is present in the room and the patient really is unable to provide any insight seem to get angry when I mention the notes here to discuss changes in her memory/thinking.     PAST MEDICAL HISTORY:  Past Medical History:   Diagnosis Date    Atrial fibrillation, chronic (HCC)     Back pain     Sciatica     Spondylolysis        PAST SURGICAL HISTORY:  Past Surgical History:   Procedure Laterality Date    HX APPENDECTOMY      HX TONSIL AND ADENOIDECTOMY         FAMILY HISTORY:   Family history unable to be obtained      SOCIAL HISTORY:  Social History     Socioeconomic History    Marital status:    Tobacco Use    Smoking status: Never Smoker    Smokeless tobacco: Never Used   Substance and Sexual Activity    Alcohol use: Yes     Comment: occ    Drug use: Not Currently         MEDICATIONS:   Current Facility-Administered Medications   Medication Dose Route Frequency Provider Last Rate Last Admin    oxyCODONE IR (ROXICODONE) tablet 5 mg  5 mg Oral Q8H Bakari Daugherty MD   5 mg at 01/27/22 0937    acetaminophen (TYLENOL) tablet 650 mg  650 mg Oral Q8H Bakari Daugherty MD   650 mg at 01/27/22 0937    lidocaine 4 % patch 2 Patch  2 Patch TransDERmal Q24H Ryan Forte MD   2 Patch at 01/27/22 6152    sodium chloride (NS) flush 5-40 mL  5-40 mL IntraVENous Q8H Olga Cole MD   10 mL at 01/26/22 2107    sodium chloride (NS) flush 5-40 mL  5-40 mL IntraVENous PRN Olga Cole MD        acetaminophen (TYLENOL) tablet 650 mg  650 mg Oral Q4H PRN Olga Cole MD        morphine injection 1 mg  1 mg IntraVENous Q4H PRN Olga Cole MD   1 mg at 01/27/22 2132    [Held by provider] heparin (porcine) injection 5,000 Units  5,000 Units SubCUTAneous Q8H Olga Cole MD   5,000 Units at 01/26/22 2106    hydrALAZINE (APRESOLINE) 20 mg/mL injection 10 mg  10 mg IntraVENous Q6H PRN Olga Cole MD             ALLERGIES:  No Known Allergies      REVIEW OF SYSTEMS:  10 point ROS reviewed with patient and negative except for those listed above. PHYSICAL EXAM:  Vital Signs:   Visit Vitals  BP (!) 106/57 (BP 1 Location: Left upper arm, BP Patient Position: At rest)   Pulse 62   Temp 97.7 °F (36.5 °C)   Resp 21   Ht 5' 7\" (1.702 m)   Wt 114 lb (51.7 kg)   SpO2 92%   BMI 17.85 kg/m²     Elderly female laying in bed appearing discombobulated. HEENT is grossly unremarkable with some bitemporal muscle loss. Neck appears supple. Patient states no when asked if I can listen to her heart or lungs. Abdomen is nondistended. Extremities appear warm/dry to observation. Patient is not appear oriented she is oriented to herself think she is at home gets angry when asked further orientation questions. Attention is impaired to casual conversation. Speech is clear but minimal and output. Language is nonfluent and she generally has trouble completing a single sentence. Cranial nerves II through XII appear grossly intact to observation. Motorically she moves all extremities equally.   Coordination appears intact in upper extremities observation. Remainder examination is deferred largely due to patient participation    PERTINENT DATA:  CT Results (maximum last 3): Results from [place] encounter on 01/24/22    CT HEAD WO CONT    Narrative  INDICATION: ? fall    Exam: Noncontrast CT of the brain is performed with 5 mm collimation. CT dose reduction was achieved with the use of the standardized protocol  tailored for this examination and automatic exposure control for dose  modulation. FINDINGS: There is age-appropriate diffuse cortical atrophy. There is no acute  intracranial hemorrhage, mass, mass effect or herniation. Ventricular system is  normal. There are periventricular white matter hypodensities consistent with  chronic microvascular ischemic changes. There is no evidence of acute  territorial infarct. The mastoid air cells are well pneumatized. The visualized  paranasal sinuses are normal.    Impression  No acute intracranial hemorrhage, mass or infarct. CT ABD PELV W CONT    Narrative  EXAM:  CT ABD PELV W CONT    INDICATION: Abdominal pain    COMPARISON: MRI lumbar spine 3/26/2022. TECHNIQUE: Helical CT of the abdomen  and pelvis  following the uneventful  intravenous administration of nonionic contrast.  Coronal and sagittal reformats  are performed. CT dose reduction was achieved through use of a standardized  protocol tailored for this examination and automatic exposure control for dose  modulation. FINDINGS:  The visualized lung bases demonstrate no mass or consolidation. There is stable  cardiomegaly. There is no pericardial or pleural effusion. The liver, spleen, pancreas, and adrenal glands are normal. The gall bladder is  present  without intra- or extra-hepatic biliary dilatation. The kidneys are symmetric without hydronephrosis. There are no dilated bowel loops. The appendix is surgically absent.  There is  extensive sigmoid diverticulosis without focal adjacent inflammation. There are no enlarged lymph nodes. There is no free fluid or free air. The urinary bladder is normal.  There is no pelvic mass. There is stable chronic lumbar compression fractures. There is no aggressive  bony lesion. Impression  No acute abnormality in the abdomen or pelvis. CTA CHEST W OR W WO CONT    Narrative  EXAM: CT thorax PE    INDICATION: Chest pain. Evaluation for aortic dissection. COMPARISON: Thoracic MRI performed 3/26/2020. CONTRAST: 100 mL of Isovue-370. TECHNIQUE:  Precontrast  images were obtained to localize the volume for acquisition. Multislice helical CT arteriography was performed from the diaphragm to the  thoracic inlet during uneventful rapid bolus intravenous contrast  administration. Lung and soft tissue windows were generated. Coronal and  sagittal images were generated and 3D post processing consisting of coronal  maximum intensity images was performed. CT dose reduction was achieved through  use of a standardized protocol tailored for this examination and automatic  exposure control for dose modulation. FINDINGS:  The lungs are clear of mass, nodule, airspace disease or edema. The pulmonary arteries are well enhanced and no pulmonary emboli are identified. The heart is enlarged. There is no mediastinal or hilar adenopathy or mass. The aorta enhances normally  without evidence of aneurysm or dissection. There are multiple severe thoracic compression fractures in a patient with a  level convex scoliosis. The visualized portions of the upper abdominal organs are normal.    Impression  No evidence for aortic dissection. Cardiomegaly  Multiple thoracic compression fractures, stable      MRI Results (maximum last 3): Results from [place] encounter on 01/24/22    MRI [facility] SPINE WO CONT    Narrative  EXAM: MRI [facility] SPINE WO CONT    INDICATION: BACK PAIN.     COMPARISON: MRI 3/26/2020    TECHNIQUE: MR imaging of the thoracic spine was performed using the following  sequences: sagittal T1, T2, stir; axial T1, T2.    CONTRAST: None. FINDINGS:    Acute T7 and T9 vertebral compression fractures are shown appearing moderate T7  and moderate to severe at T9. The degree of osseous signal alteration is greater  in the T9 vertebral body. There is up to 70% loss of vertebral body height at T7  and 90% loss of vertebral body height at T9. Multiple remote vertebral compression fractures are again noted including mild  T4, moderate T5, moderate-severe T6, moderate T8, moderate-severe T11, moderate  T12, mild L1 and moderate L2 vertebral compression fractures. Previously  demonstrated acute fracture or edema at T11-T12 is resolved. A severe kyphosis is demonstrated which is centered at T8, estimated at 92  degrees. There is mild retropositioning of several vertebral bodies,  predominantly the T11 and T12 vertebral bodies which is unchanged in appearance  and measuring up to 4 mm. No substantial listhesis of the acute fracture  segments is shown. Cord signal is normal. The conus terminates at L2. No intraspinal or paraspinal  soft tissue mass is shown. There is diffuse mid and lower thoracic spondylosis  as well as upper lumbar spondylosis with disc bulging especially prominent at  L2-3 in the left lateral and foraminal regions. No thoracic spine canal stenosis  is shown. Foraminal stenosis is shown bilaterally at T10-11 through T12-L1. Impression  Multilevel vertebral compression fractures, acute at T7 and T9 with details  above. 23X      Results from [place] encounter on 03/26/20    MRI LUMB SPINE WO CONT    Narrative  EXAM: MRI LUMB SPINE WO CONT  Clinical history: low back pain  INDICATION: . Low back pain    COMPARISON: None    TECHNIQUE: MR imaging of the lumbar spine was performed using the following  sequences: sagittal T1, T2, STIR;  axial T1, T2.    CONTRAST:  None.     FINDINGS:    There are acute vertebral compression deformities at T11-T12 and at L4. There is  greater than 50% loss vertebral body height at T11. Approximately 40% loss  vertebral body height at T12 and less than 30% loss vertebral body height at L4. Multilevel minimal spondylolisthesis. No significant cortical retropulsion or  epidural hematoma. There is dextroscoliosis of the upper lumbar spine. There is  no sacral fracture. No pleural effusion. Abdominal aorta normal in caliber. The  conus medullaris terminates at 1. Signal and caliber of the distal spinal cord  are within normal limits. T12/L1 disc bulge/osteophyte. Canal is patent. Minimal  right foraminal stenosis. L1-L2: Normal retrolisthesis. Facet arthropathy. Disc bulge/osteophyte. Canal is  patent. Moderate left foraminal stenosis. L2-L3: Facet  arthropathy. Ligamentum flavum hypertrophy. Mild left paracentral  protrusion extends to the left foramina. Moderate left foraminal stenosis. L3-L4: Facet arthropathy. Disc bulge/osteophyte. Canal is patent. Moderate right  foraminal stenosis. Moderate left foraminal stenosis. L4-L5: Mild facet arthropathy. Disc bulge/osteophyte. Canal is patent. Mild  bilateral foraminal stenosis. L5-S1: Facet arthropathy and hypertrophy is greater on the right. Spondylolysis  lysis on the right. 4 mm anterolisthesis. Canal is patent. Severe right  foraminal stenosis. Impression  IMPRESSION:  Acute vertebral compression deformities at T11, T12 and L4. Greater than 50%  loss vertebral height at T11. Less than 50% loss vertebral body height at T12  and L4. No significant cortical retropulsion or epidural hematoma. These fractures are amenable to percutaneous kyphoplasty for palliation of back  pain. Multilevel disc and facet degenerative change with moderate bilateral foraminal  stenoses at L3-4 and severe right foraminal stenosis at L5-S1. Other less severe degenerative changes are as described above.       MRI 2 [facility] CONT    Narrative  EXAM: MRI [facility] SPINE WO CONT  Clinical history: Back pain  INDICATION: Severe back pain. .    COMPARISON: None    TECHNIQUE: MR imaging of the thoracic spine was performed using the following  sequences: sagittal T1, T2, stir; axial T1, T2.    CONTRAST: None. FINDINGS:    Vertebral compression deformities at T11 and T12. Greater than 50% loss  vertebral body height at T11. Less than 50% loss vertebral height at T12. Mild  extension into the posterior elements at T12. No cortical retropulsion or  epidural hematoma. . Chronic vertebral compression rods at T4, T5, T6, T7, T8. Kyphosis. Thoracic aorta normal in caliber. No pleural effusion. Scoliotic  change. .    The course, caliber, and signal intensity of the spinal cord are normal.    The paraspinal soft tissues are within normal limits. Mild bilateral foraminal stenoses at T11-T12. Mild bilateral foraminal stenoses  at T10 as T11. No significant central canal stenosis. Impression  IMPRESSION:  Acute vertebral compression deformities at T11 and T12 with greater than 50%  loss vertebral body height at T11. No significant cortical retropulsion or  epidural hematoma. No cord compression is associated. These fractures are  amenable to percutaneous kyphoplasty for alleviation of pain. Mild multilevel degenerative change.     ASSESSMENT:      Ely Arriaza is a 59-year-old female admitted for pain and new diagnosis of cardiomyopathy on whom neurology was consulted for altered sensorium which appears secondary to underlying dementia    RECOMMENDATIONS:  Cognitive impairment:  Appears the patient likely suffers from moderately advanced dementia NOS  As always the hospital is not really a place for new diagnosis dementia, initial evaluation of it can be seen as an outpatient as desired  Will check B12/TSH  Would not order head scan to avoid minimization of frequent transportation of patient which will contribute to delirium  Symptoms been going on for 2 years per consult order, would not classify as rapidly progressive  Based on limited interaction with patient would have serious concerns about patient's ability to live on her home with placement a long-term care facility a reasonable thought  If agitation remains an issue could consider behavioral strategies as well as chemical restraints with medications like quetiapine use sparingly    Please call if neurology can be of further assistance      Volodymyr Hollingsworth MD

## 2022-01-28 LAB
ANION GAP SERPL CALC-SCNC: 3 MMOL/L (ref 5–15)
BASOPHILS # BLD: 0 K/UL (ref 0–0.1)
BASOPHILS NFR BLD: 0 % (ref 0–1)
BUN SERPL-MCNC: 15 MG/DL (ref 6–20)
BUN/CREAT SERPL: 31 (ref 12–20)
CALCIUM SERPL-MCNC: 9.4 MG/DL (ref 8.5–10.1)
CHLORIDE SERPL-SCNC: 101 MMOL/L (ref 97–108)
CO2 SERPL-SCNC: 25 MMOL/L (ref 21–32)
CREAT SERPL-MCNC: 0.49 MG/DL (ref 0.55–1.02)
DIFFERENTIAL METHOD BLD: ABNORMAL
EOSINOPHIL # BLD: 0 K/UL (ref 0–0.4)
EOSINOPHIL NFR BLD: 0 % (ref 0–7)
ERYTHROCYTE [DISTWIDTH] IN BLOOD BY AUTOMATED COUNT: 12 % (ref 11.5–14.5)
GLUCOSE SERPL-MCNC: 121 MG/DL (ref 65–100)
HCT VFR BLD AUTO: 45.6 % (ref 35–47)
HGB BLD-MCNC: 14.9 G/DL (ref 11.5–16)
IMM GRANULOCYTES # BLD AUTO: 0.1 K/UL (ref 0–0.04)
IMM GRANULOCYTES NFR BLD AUTO: 1 % (ref 0–0.5)
LYMPHOCYTES # BLD: 0.9 K/UL (ref 0.8–3.5)
LYMPHOCYTES NFR BLD: 8 % (ref 12–49)
MCH RBC QN AUTO: 34.2 PG (ref 26–34)
MCHC RBC AUTO-ENTMCNC: 32.7 G/DL (ref 30–36.5)
MCV RBC AUTO: 104.6 FL (ref 80–99)
MONOCYTES # BLD: 1.2 K/UL (ref 0–1)
MONOCYTES NFR BLD: 10 % (ref 5–13)
NEUTS SEG # BLD: 9.5 K/UL (ref 1.8–8)
NEUTS SEG NFR BLD: 81 % (ref 32–75)
NRBC # BLD: 0 K/UL (ref 0–0.01)
NRBC BLD-RTO: 0 PER 100 WBC
PLATELET # BLD AUTO: 176 K/UL (ref 150–400)
POTASSIUM SERPL-SCNC: 4.3 MMOL/L (ref 3.5–5.1)
RBC # BLD AUTO: 4.36 M/UL (ref 3.8–5.2)
RBC MORPH BLD: ABNORMAL
RBC MORPH BLD: ABNORMAL
SODIUM SERPL-SCNC: 129 MMOL/L (ref 136–145)
TSH SERPL DL<=0.05 MIU/L-ACNC: 0.39 UIU/ML (ref 0.36–3.74)
VIT B12 SERPL-MCNC: 683 PG/ML (ref 193–986)
WBC # BLD AUTO: 11.7 K/UL (ref 3.6–11)

## 2022-01-28 PROCEDURE — 97166 OT EVAL MOD COMPLEX 45 MIN: CPT

## 2022-01-28 PROCEDURE — 96372 THER/PROPH/DIAG INJ SC/IM: CPT

## 2022-01-28 PROCEDURE — 97530 THERAPEUTIC ACTIVITIES: CPT

## 2022-01-28 PROCEDURE — 74011000250 HC RX REV CODE- 250: Performed by: FAMILY MEDICINE

## 2022-01-28 PROCEDURE — 96376 TX/PRO/DX INJ SAME DRUG ADON: CPT

## 2022-01-28 PROCEDURE — 36415 COLL VENOUS BLD VENIPUNCTURE: CPT

## 2022-01-28 PROCEDURE — 80048 BASIC METABOLIC PNL TOTAL CA: CPT

## 2022-01-28 PROCEDURE — 84443 ASSAY THYROID STIM HORMONE: CPT

## 2022-01-28 PROCEDURE — 85025 COMPLETE CBC W/AUTO DIFF WBC: CPT

## 2022-01-28 PROCEDURE — 65660000000 HC RM CCU STEPDOWN

## 2022-01-28 PROCEDURE — 74011250637 HC RX REV CODE- 250/637: Performed by: HOSPITALIST

## 2022-01-28 PROCEDURE — 65390000012 HC CONDITION CODE 44 OBSERVATION

## 2022-01-28 PROCEDURE — 94761 N-INVAS EAR/PLS OXIMETRY MLT: CPT

## 2022-01-28 PROCEDURE — 74011250636 HC RX REV CODE- 250/636: Performed by: FAMILY MEDICINE

## 2022-01-28 PROCEDURE — 82607 VITAMIN B-12: CPT

## 2022-01-28 RX ORDER — HALOPERIDOL 1 MG/1
1 TABLET ORAL 2 TIMES DAILY
Status: DISCONTINUED | OUTPATIENT
Start: 2022-01-28 | End: 2022-02-06 | Stop reason: HOSPADM

## 2022-01-28 RX ORDER — OXYCODONE HYDROCHLORIDE 5 MG/1
2.5 TABLET ORAL EVERY 8 HOURS
Status: DISCONTINUED | OUTPATIENT
Start: 2022-01-28 | End: 2022-01-29

## 2022-01-28 RX ADMIN — OXYCODONE 2.5 MG: 5 TABLET ORAL at 16:39

## 2022-01-28 RX ADMIN — SODIUM CHLORIDE, PRESERVATIVE FREE 10 ML: 5 INJECTION INTRAVENOUS at 14:00

## 2022-01-28 RX ADMIN — HALOPERIDOL 1 MG: 1 TABLET ORAL at 12:07

## 2022-01-28 RX ADMIN — HEPARIN SODIUM 5000 UNITS: 5000 INJECTION INTRAVENOUS; SUBCUTANEOUS at 20:54

## 2022-01-28 RX ADMIN — ACETAMINOPHEN 650 MG: 325 TABLET ORAL at 22:47

## 2022-01-28 RX ADMIN — HEPARIN SODIUM 5000 UNITS: 5000 INJECTION INTRAVENOUS; SUBCUTANEOUS at 12:00

## 2022-01-28 RX ADMIN — MORPHINE SULFATE 1 MG: 2 INJECTION, SOLUTION INTRAMUSCULAR; INTRAVENOUS at 12:07

## 2022-01-28 NOTE — PROGRESS NOTES
Transition of Care  1. RUR 14% Low  2. Disposition SNF-choices pending  3. DME None  4. Transportation BLS  5. Follow Up PCP, Specialist      CM attempted phone call to patient's daughter Renae Tillman [phone] to discuss choices for SNF. Renae Tillman unavailable and CM unable to leave a message. Will try back later. Patient currently confused. Margo Moreno, MS    3:30 CM met with patient's son Ari Lee [phone], patient asleep. Fei Shepherd is a nurse and lives in [facility]. Fei Shepherd provided SNF list for review with patient/family for choices to initiate referrals. Per Fei Cora, the patient and her  are involved in a settlement dispute regarding their home. Patient and  had to vacate their home and have been living in a two bedroom apt. The patient, however, decided that she wanted to return to her home and has been there alone- remains in apt. Fei Shepherd acknowledges there are some cognitive issues and he has seen some changes in patient's memory. Fei Shepherd provided first choice for [facility]. CM to send referral and await other choices from family. [employer] confirmed patient has received COVID vaccination and booster. Transition of Care Plan:     The Plan for Transition of Care is related to the following treatment goals: SNF    The Patient and/or patient representative was provided with a choice of provider and agrees  with the discharge plan. Yes [x] No []    A Freedom of choice list was provided with basic dialogue that supports the patient's individualized plan of care/goals and shares the quality data associated with the providers.        Yes [x] No []      Margo Moreno, MS

## 2022-01-28 NOTE — PROGRESS NOTES
Problem: Mobility Impaired (Adult and Pediatric)  Goal: *Acute Goals and Plan of Care (Insert Text)  Description: FUNCTIONAL STATUS PRIOR TO ADMISSION: Patient is a poor historian. Reports independence with mobility. HOME SUPPORT PRIOR TO ADMISSION: The patient lived alone with a local daughter who checked in regularly. Patient typically independent for ADLs. Physical Therapy Goals  Initiated 1/27/2022  1. Patient will move from supine to sit and sit to supine  in bed with moderate assistance  within 7 day(s). 2.  Patient will transfer from bed to chair and chair to bed with moderate assistance  using the least restrictive device within 7 day(s). 3.  Patient will perform sit to stand with moderate assistance  within 7 day(s). 4.  Patient will ambulate with moderate assistance  for 50 feet with the least restrictive device within 7 day(s). Outcome: Progressing Towards Goal     PHYSICAL THERAPY TREATMENT  Patient: Tavon Obregon (49 y.o. female)  Date: 1/28/2022  Diagnosis: [facility]estive heart failure[facility] [I50.9] <principal problem not specified>       Precautions:    Chart, physical therapy assessment, plan of care and goals were reviewed. ASSESSMENT  Patient continues with skilled PT services and is progressing towards goals. Patient remains limited by confusion, poor initiation and sequencing of activity, poor balance, and posture following admission for CHF. The patient's son was present for today's session to confirm hx and to observe the session. The patient appeared more alert and coherent compared to prior session but had brief periods of unresponsiveness, especially when laid fully supine. Patient would respond with continued stimulation but after a 5+ second pause. Transferred to sitting EOB required 2 attempts before successful d/t reported pain and patient actively resisting when attempting to log roll for sitting.  She was then able to sit EOB for ~10 min total with severely flexed posture resting on her elbows in midline. Posture briefly improved when BUE were placed on a RW but she was unable to maintain and quickly fatigued. Unsafe to progress beyond EOB and returned in right sidelying for pressure relief post session. Current Level of Function Impacting Discharge (mobility/balance): maximum assist x2 supine to sit    Other factors to consider for discharge:          PLAN :  Patient continues to benefit from skilled intervention to address the above impairments. Continue treatment per established plan of care. to address goals. Recommendation for discharge: (in order for the patient to meet his/her long term goals)  Therapy up to 5 days/week in SNF setting        IF patient discharges home will need the following DME: to be determined (TBD)       SUBJECTIVE:   Patient stated I hate this bed.     OBJECTIVE DATA SUMMARY:   Critical Behavior:  Neurologic State: Confused  Orientation Level: Disoriented to place,Disoriented to situation,Disoriented to time,Oriented to person  Cognition: Poor safety awareness,Decreased attention/concentration,Decreased command following     Functional Mobility Training:  Bed Mobility:  Rolling: Total assistance  Supine to Sit: Maximum assistancex2     Scooting: Total assistance        Transfers:                                   Balance:  Sitting: Impaired  Sitting - Static: Poor (constant support); Fair (occasional)  Sitting - Dynamic: Poor (constant support); Prop sitting        Activity Tolerance:   Fair    After treatment patient left in no apparent distress:   Supine in bed, Patient positioned in right  sidelying for pressure relief, and Call bell within reach    COMMUNICATION/COLLABORATION:   The patients plan of care was discussed with: Registered nurse.      Dee Lazo, PT, DPT   Time Calculation: 35 mins

## 2022-01-28 NOTE — PROGRESS NOTES
2210:      Attempted incontinence care on patient. While the PCT was providing care the patient began to get physically and verbally aggressive. Patient kicked, hit, and screamed. Also patient called PCT inappropriate names while getting care.

## 2022-01-28 NOTE — PROGRESS NOTES
Problem: Self Care Deficits Care Plan (Adult)  Goal: *Acute Goals and Plan of Care (Insert Text)  Description: FUNCTIONAL STATUS PRIOR TO ADMISSION: Pt lives alone. She and her  lives in separate households. She performs light meal prep with a lot of delivered food items or microwavable meals. She does not use AD and performs her own ADLs. Pt sits in tub to bathe. HOME SUPPORT: lived alone with sister and son    Occupational Therapy Goals  Initiated 1/28/2022  1. Patient will perform transfer to [facility] with RW with moderate assistance within 7 day(s). 2.  Patient will perform lower body dressing with AE and moderate assistance  within 7 day(s). 3.  Patient will perform bathing in supported sitting with moderate assistance  within 7 day(s). 4.  Patient will perform all aspects of toileting with moderate assistance  within 7 day(s). 5  Outcome: Not Met    OCCUPATIONAL THERAPY EVALUATION  Patient: Cassie Gann (20 y.o. female)  Date: 1/28/2022  Primary Diagnosis: [facility] [I50.9]        Precautions: fall, back       ASSESSMENT  Based on the objective data described below, the patient presents with acute thoracic back pain, impaired decision making, decreased activity tolerance, fair to poor sitting balance and need for A with all bed mobility and ADLs. Pt also with decreased sequencing and task initiation for activity. Pt actively resistive of efforts for log rolling and required two attempts to successfully achieve sitting with max A x 2. Pt demonstrated significantly flexed posture while sitting EOB with head close to knees and elbows resting on her thighs. Pt demonstrated two episodes of brief postural extension before fatiguing back into a flexed posture. She is needing A for all ADLs at bed level at this time, min A to total A. Recommend SNF rehab and son in agreement at bedside. Of note, while documenting spoke with MD and RN.  Pt's MRI now + for acute compression fx at T7 and T9. Neuro sx re-consulted. Current Level of Function Impacting Discharge (ADLs/self-care): min to total A ADLs    Functional Outcome Measure: The patient scored Total: 15/100 on the Barthel Index outcome measure which is indicative of being dependent in basic self-care. Other factors to consider for discharge: from home     Patient will benefit from skilled therapy intervention to address the above noted impairments. PLAN :  Recommendations and Planned Interventions: self care training, functional mobility training, therapeutic exercise, balance training, therapeutic activities, endurance activities, and patient education    Frequency/Duration: Patient will be followed by occupational therapy 4 times a week to address goals. Recommendation for discharge: (in order for the patient to meet his/her long term goals)  Therapy up to 5 days/week in SNF setting    This discharge recommendation:  Has been made in collaboration with the attending provider and/or case management    IF patient discharges home will need the following DME: TBD       SUBJECTIVE:   Patient stated its pain like right between my shoulder blades.     OBJECTIVE DATA SUMMARY:   HISTORY:   Past Medical History:   Diagnosis Date    Atrial fibrillation, chronic (HCC)     Back pain     Sciatica     Spondylolysis      Past Surgical History:   Procedure Laterality Date    HX APPENDECTOMY      HX TONSIL AND ADENOIDECTOMY         Expanded or extensive additional review of patient history:     Home Situation  Home Environment: Private residence  # Steps to Enter: 4  One/Two Story Residence:  (3 story and patinet's bedroom on 2nd)  Living Alone: Yes  Support Systems: Child(catherine),Friend/Neighbor  Patient Expects to be Discharged to[de-identified]  (TBD)  Current DME Used/Available at Home: Walker, rolling  Tub or Shower Type: Tub/Shower combination    Hand dominance: Right    EXAMINATION OF PERFORMANCE DEFICITS:  Cognitive/Behavioral Status:  Neurologic State: Confused  Orientation Level: Disoriented to place; Disoriented to situation;Disoriented to time;Oriented to person  Cognition: Poor safety awareness;Decreased attention/concentration;Decreased command following             Skin: intact    Edema: none noetd    Hearing: Auditory  Auditory Impairment: None    Vision/Perceptual:                           Acuity: Within Defined Limits         Range of Motion:  =  AROM: Generally decreased, functional                         Strength:    Strength: Generally decreased, functional                Coordination:  Coordination: Generally decreased, functional  Fine Motor Skills-Upper: Left Intact; Right Intact    Gross Motor Skills-Upper: Left Intact; Right Intact    Tone & Sensation:  intact                            Balance:  Sitting: Impaired  Sitting - Static: Poor (constant support); Fair (occasional)  Sitting - Dynamic: Poor (constant support); Prop sitting    Functional Mobility and Transfers for ADLs:  Bed Mobility:  Rolling: Total assistance  Supine to Sit: Maximum assistance  Scooting: Total assistance    Transfers:       ADL Assessment:  Feeding: Minimum assistance    Oral Facial Hygiene/Grooming: Minimum assistance    Bathing: Maximum assistance    Upper Body Dressing: Moderate assistance    Lower Body Dressing: Maximum assistance    Toileting: Total assistance                ADL Intervention and task modifications:                                          Therapeutic Exercise: Attempted to educate pt on log roll, safe positioning and re-positioning in bed for skin integrity and pain management. Functional Measure:    Barthel Index:  Bathin  Bladder: 5  Bowels: 5  Groomin  Dressin  Feedin  Mobility: 0  Stairs: 0  Toilet Use: 0  Transfer (Bed to Chair and Back): 0  Total: 15/100      The Barthel ADL Index: Guidelines  1.  The index should be used as a record of what a patient does, not as a record of what a patient could do.  2. The main aim is to establish degree of independence from any help, physical or verbal, however minor and for whatever reason. 3. The need for supervision renders the patient not independent. 4. A patient's performance should be established using the best available evidence. Asking the patient, friends/relatives and nurses are the usual sources, but direct observation and common sense are also important. However direct testing is not needed. 5. Usually the patient's performance over the preceding 24-48 hours is important, but occasionally longer periods will be relevant. 6. Middle categories imply that the patient supplies over 50 per cent of the effort. 7. Use of aids to be independent is allowed. Score Interpretation (from [facility])    Independent   60-79 Minimally independent   40-59 Partially dependent   20-39 Very dependent   <20 Totally dependent     -Richard Gorman., Barthel, DMorenitaW. (1965). Functional evaluation: the Barthel Index. [facility] ([address] (1997). The Barthel activities of daily living index: self-reporting versus actual performance in the old (> or = 75 years). Journal [address] 457), [address] [facility], J.MorenitaJOHN, Jacque Almanzar., Diego Krishnan. (1999). Measuring the change in disability after inpatient rehabilitation; comparison of the responsiveness of the Barthel Index and Functional [place] Measure. Journal of Neurology, Neurosurgery, and Psychiatry, 66(4), 392-565. COOKIE Li.PARRIS, DAKOTA Elaine, & Pham Oreilly M.A. (2004) Assessment of post-stroke quality of life in cost-effectiveness studies: The usefulness of the Barthel Index and the EuroQoL-5D.  Quality of Life Research, 15, 576-89     Occupational Therapy Evaluation Charge Determination   History Examination Decision-Making   MEDIUM Complexity : Expanded review of history including physical, cognitive and psychosocial  history  MEDIUM Complexity : 3-5 performance deficits relating to physical, cognitive , or psychosocial skils that result in activity limitations and / or participation restrictions MEDIUM Complexity : Patient may present with comorbidities that affect occupational performnce. Miniml to moderate modification of tasks or assistance (eg, physical or verbal ) with assesment(s) is necessary to enable patient to complete evaluation       Based on the above components, the patient evaluation is determined to be of the following complexity level: MEDIUM  Pain Rating:  Thoracic back pain    Activity Tolerance:   Fair to poor    After treatment patient left in no apparent distress:    Supine in bed, Patient positioned in R sidelying for pressure relief, Call bell within reach, Bed / chair alarm activated, Caregiver / family present, and Side rails x 3    COMMUNICATION/EDUCATION:   The patients plan of care was discussed with: Physical therapist, Registered nurse, and Physician. Patient/family have participated as able in goal setting and plan of care. This patients plan of care is appropriate for delegation to [facility].     Thank you for this referral.  Mikala Quinn OT  Time Calculation: 38 mins

## 2022-01-29 PROCEDURE — 74011000250 HC RX REV CODE- 250: Performed by: FAMILY MEDICINE

## 2022-01-29 PROCEDURE — 94761 N-INVAS EAR/PLS OXIMETRY MLT: CPT

## 2022-01-29 PROCEDURE — 74011250636 HC RX REV CODE- 250/636: Performed by: FAMILY MEDICINE

## 2022-01-29 PROCEDURE — 96372 THER/PROPH/DIAG INJ SC/IM: CPT

## 2022-01-29 PROCEDURE — 74011250637 HC RX REV CODE- 250/637: Performed by: HOSPITALIST

## 2022-01-29 PROCEDURE — 65390000012 HC CONDITION CODE 44 OBSERVATION

## 2022-01-29 PROCEDURE — 65660000000 HC RM CCU STEPDOWN

## 2022-01-29 RX ORDER — POLYETHYLENE GLYCOL 3350 17 G/17G
17 POWDER, FOR SOLUTION ORAL DAILY
Status: DISCONTINUED | OUTPATIENT
Start: 2022-01-29 | End: 2022-02-06 | Stop reason: HOSPADM

## 2022-01-29 RX ORDER — TRAMADOL HYDROCHLORIDE 50 MG/1
50 TABLET ORAL 2 TIMES DAILY
Status: DISCONTINUED | OUTPATIENT
Start: 2022-01-29 | End: 2022-02-05

## 2022-01-29 RX ADMIN — SODIUM CHLORIDE, PRESERVATIVE FREE 10 ML: 5 INJECTION INTRAVENOUS at 14:00

## 2022-01-29 RX ADMIN — TRAMADOL HYDROCHLORIDE 50 MG: 50 TABLET, COATED ORAL at 17:35

## 2022-01-29 RX ADMIN — OXYCODONE 2.5 MG: 5 TABLET ORAL at 06:57

## 2022-01-29 RX ADMIN — SODIUM CHLORIDE, PRESERVATIVE FREE 10 ML: 5 INJECTION INTRAVENOUS at 23:15

## 2022-01-29 RX ADMIN — ACETAMINOPHEN 650 MG: 325 TABLET ORAL at 06:57

## 2022-01-29 RX ADMIN — ACETAMINOPHEN 650 MG: 325 TABLET ORAL at 22:57

## 2022-01-29 RX ADMIN — TRAMADOL HYDROCHLORIDE 50 MG: 50 TABLET, COATED ORAL at 14:00

## 2022-01-29 RX ADMIN — HEPARIN SODIUM 5000 UNITS: 5000 INJECTION INTRAVENOUS; SUBCUTANEOUS at 06:56

## 2022-01-29 RX ADMIN — HALOPERIDOL 1 MG: 1 TABLET ORAL at 23:13

## 2022-01-29 RX ADMIN — HEPARIN SODIUM 5000 UNITS: 5000 INJECTION INTRAVENOUS; SUBCUTANEOUS at 23:11

## 2022-01-29 NOTE — PROGRESS NOTES
[address] [facility] Adult  Hospitalist Group                                                                                          Hospitalist Progress Note  Alejandro Riggs MD  Answering service: [phone] OR [phone] from in house phone        Date of Service:  2022  NAME:  Kaleb Piedra  :  1935  MRN:  [id number]      Admission Summary:   History was primarily obtained from the patient's daughter, patient is alert, is somewhat confused, suspect underlying dementia, patient apparently presented with back pain that started a few days back, patient reports that she is not sure whether she had a fall or not but as far she remembers she has not had any falls or injuries, patient reports that it is hard for her to walk because she continues to have significant pain, came to the ER yesterday, who was found to have multiple thoracic compression fractures, the daughter reports that she has had history of thoracic compression fractures in the past, the daughter also reports that patient is having some shortness of breath on exertion, no fever chills, has had received vaccine focal    Interval history / Subjective:    Patient seen and examined at bedside. Sleeping peacefully      Assessment & Plan:     Acute T7 and T9 compression fx with pain  Upper and lower back pain  -Evaluated by neurosurgery. No indication for acute neurosurgery at this time. - worsening of pain , MRI T spine reviewed  - d/w Neurosurgery , recommends IR consult for Kyphoplasty  - IR consulted   -PT OT to evaluate patient  - pain control with Tylenol  - d/c Oxy, start Tramadol BID for excessive drowsiness  - bowel regimen    CHF  -Euvolemic and not acutely decompensated. -Can follow with cardiology outpatient.     Possible moderately advanced Dementia  - Neuro eval noted, likely has underlying moderate dementia, needs formal eval in outpatient setting.   - not safe for discharge home, PT recommends SNF, will get OT eval    Code status: full code  DVT prophylaxis: heparin    Care Plan discussed with: Patient/Family  Anticipated Disposition: Home w/Family  Anticipated Discharge: 24 hours to 48 hours, SNF on discharge      Hospital Problems  Never Reviewed          Codes Class Noted POA    CHF (congestive heart failure) ([facility] ICD-10-CM: I50.9  ICD-9-CM: 428.0  1/24/2022 Unknown                Review of Systems:   A comprehensive review of systems was negative except for that written in the HPI. Vital Signs:    Last 24hrs VS reviewed since prior progress note. Most recent are:  Visit Vitals  BP (!) 144/92 (BP 1 Location: Right upper arm, BP Patient Position: Lying left side)   Pulse 69   Temp 98 °F (36.7 °C)   Resp 18   Ht 5' 7\" (1.702 m)   Wt 51.7 kg (114 lb)   SpO2 97%   BMI 17.85 kg/m²       No intake or output data in the 24 hours ending 01/29/22 1343     Physical Examination:     I had a face to face encounter with this patient and independently examined them on 1/29/2022 as outlined below:          Constitutional:  No acute distress, cooperative, sleeping    Resp:  CTA bilaterally. CV:  Regular rhythm, normal rate, no murmurs, gallops, rubs    GI:  Soft, non distended, non tender. normoactive bowel sounds,    Musculoskeletal/  Back:  No edema, warm, 2+ pulses throughout   ++tenderness midspinal in lower thoracic area     Neurologic:  drowsy         Data Review:    Review and/or order of clinical lab test      Labs:     Recent Labs     01/28/22  0118   WBC 11.7*   HGB 14.9   HCT 45.6        Recent Labs     01/28/22  0118   *   K 4.3      CO2 25   BUN 15   CREA 0.49*   *   CA 9.4     No results for input(s): ALT, AP, TBIL, TBILI, TP, ALB, GLOB, GGT, AML, LPSE in the last 72 hours. No lab exists for component: SGOT, GPT, AMYP, HLPSE  No results for input(s): INR, PTP, APTT, INREXT, INREXT in the last 72 hours. No results for input(s): FE, TIBC, PSAT, FERR in the last 72 hours.    No results found for: FOL, RBCF   No results for input(s): PH, PCO2, PO2 in the last 72 hours. No results for input(s): CPK, CKNDX, TROIQ in the last 72 hours.     No lab exists for component: CPKMB  No results found for: CHOL, CHOLX, CHLST, CHOLV, HDL, HDLP, LDL, LDLC, DLDLP, TGLX, TRIGL, TRIGP, CHHD, CHHDX  No results found for: GLUCPOC  No results found for: COLOR, APPRN, SPGRU, REFSG, MARIO, PROTU, GLUCU, KETU, BILU, UROU, ALLAN, LEUKU, GLUKE, EPSU, BACTU, WBCU, RBCU, CASTS, UCRY      Medications Reviewed:     Current Facility-Administered Medications   Medication Dose Route Frequency    traMADoL (ULTRAM) tablet 50 mg  50 mg Oral BID    haloperidoL (HALDOL) tablet 1 mg  1 mg Oral BID    acetaminophen (TYLENOL) tablet 650 mg  650 mg Oral Q8H    lidocaine 4 % patch 2 Patch  2 Patch TransDERmal Q24H    sodium chloride (NS) flush 5-40 mL  5-40 mL IntraVENous Q8H    sodium chloride (NS) flush 5-40 mL  5-40 mL IntraVENous PRN    acetaminophen (TYLENOL) tablet 650 mg  650 mg Oral Q4H PRN    morphine injection 1 mg  1 mg IntraVENous Q4H PRN    heparin (porcine) injection 5,000 Units  5,000 Units SubCUTAneous Q8H    hydrALAZINE (APRESOLINE) 20 mg/mL injection 10 mg  10 mg IntraVENous Q6H PRN     ______________________________________________________________________  EXPECTED LENGTH OF STAY: 3d 12h  ACTUAL LENGTH OF STAY:          Danica Meléndez MD

## 2022-01-29 NOTE — PROGRESS NOTES
Patient refused lab draw this morning    0800 Bedside shift change report given to   (oncoming nurse) by Dona Bowens (offgoing nurse).  Report included the following information SBAR, Kardex, ED Summary, OR Summary, Procedure Summary, Intake/Output, MAR, Accordion, Recent Results, Med Rec Status, and Cardiac Rhythm  A-Fib

## 2022-01-29 NOTE — PROGRESS NOTES
Bedside shift change report given to James Welsh (oncoming nurse) by Pamela Massey (offgoing nurse). Report included the following information Kardex, MAR and Recent Results.

## 2022-01-30 PROCEDURE — 74011250636 HC RX REV CODE- 250/636: Performed by: FAMILY MEDICINE

## 2022-01-30 PROCEDURE — 74011250637 HC RX REV CODE- 250/637: Performed by: HOSPITALIST

## 2022-01-30 PROCEDURE — 94761 N-INVAS EAR/PLS OXIMETRY MLT: CPT

## 2022-01-30 PROCEDURE — 74011000250 HC RX REV CODE- 250: Performed by: FAMILY MEDICINE

## 2022-01-30 PROCEDURE — 74011000250 HC RX REV CODE- 250: Performed by: HOSPITALIST

## 2022-01-30 PROCEDURE — 65660000000 HC RM CCU STEPDOWN

## 2022-01-30 PROCEDURE — 65390000012 HC CONDITION CODE 44 OBSERVATION

## 2022-01-30 PROCEDURE — 96372 THER/PROPH/DIAG INJ SC/IM: CPT

## 2022-01-30 RX ADMIN — SODIUM CHLORIDE, PRESERVATIVE FREE 10 ML: 5 INJECTION INTRAVENOUS at 22:59

## 2022-01-30 RX ADMIN — ACETAMINOPHEN 650 MG: 325 TABLET ORAL at 22:58

## 2022-01-30 RX ADMIN — ACETAMINOPHEN 650 MG: 325 TABLET ORAL at 13:04

## 2022-01-30 RX ADMIN — HEPARIN SODIUM 5000 UNITS: 5000 INJECTION INTRAVENOUS; SUBCUTANEOUS at 05:49

## 2022-01-30 RX ADMIN — TRAMADOL HYDROCHLORIDE 50 MG: 50 TABLET, COATED ORAL at 08:04

## 2022-01-30 RX ADMIN — ACETAMINOPHEN 650 MG: 325 TABLET ORAL at 05:46

## 2022-01-30 NOTE — PROGRESS NOTES
[address] Adult  Hospitalist Group                                                                                          Hospitalist Progress Note  Reinaldo Wood MD  Answering service: [phone] OR [phone] from in house phone        Date of Service:  2022  NAME:  Chema Rose  :  1935  MRN:  [id number]      Admission Summary:   History was primarily obtained from the patient's daughter, patient is alert, is somewhat confused, suspect underlying dementia, patient apparently presented with back pain that started a few days back, patient reports that she is not sure whether she had a fall or not but as far she remembers she has not had any falls or injuries, patient reports that it is hard for her to walk because she continues to have significant pain, came to the ER yesterday, who was found to have multiple thoracic compression fractures, the daughter reports that she has had history of thoracic compression fractures in the past, the daughter also reports that patient is having some shortness of breath on exertion, no fever chills, has had received vaccine focal    Interval history / Subjective:    Patient seen and examined at bedside. Awake and alert  Daughter at bedside  Pain controlled      Assessment & Plan:     Acute T7 and T9 compression fx with pain  Upper and lower back pain  -Evaluated by neurosurgery. No indication for acute neurosurgery at this time. - worsening of pain , MRI T spine reviewed  - d/w Neurosurgery , recommends IR consult for Kyphoplasty  - IR consulted   -PT OT to evaluate patient  - pain control with Tylenol  - d/c Oxy, start Tramadol BID for excessive drowsiness  - bowel regimen    CHF  -Euvolemic and not acutely decompensated. -Can follow with cardiology outpatient.     Possible moderately advanced Dementia  - Neuro eval noted, likely has underlying moderate dementia, needs formal eval in outpatient setting.   - not safe for discharge home, PT recommends SNF, will get OT eval    Code status: full code  DVT prophylaxis: heparin    Care Plan discussed with: Patient/Family  Anticipated Disposition: Home w/Family  Anticipated Discharge: 24 hours to 48 hours, SNF on discharge      Hospital Problems  Never Reviewed          Codes Class Noted POA    CHF (congestive heart failure) ([facility] ICD-10-CM: I50.9  ICD-9-CM: 428.0  1/24/2022 Unknown                Review of Systems:   A comprehensive review of systems was negative except for that written in the HPI. Vital Signs:    Last 24hrs VS reviewed since prior progress note. Most recent are:  Visit Vitals  BP (!) 111/90 (BP 1 Location: Right upper arm, BP Patient Position: At rest)   Pulse 66   Temp 97.7 °F (36.5 °C)   Resp 16   Ht 5' 7\" (1.702 m)   Wt 47.4 kg (104 lb 8 oz)   SpO2 94%   BMI 16.37 kg/m²         Intake/Output Summary (Last 24 hours) at 1/30/2022 1514  Last data filed at 1/30/2022 1006  Gross per 24 hour   Intake 150 ml   Output --   Net 150 ml        Physical Examination:     I had a face to face encounter with this patient and independently examined them on 1/30/2022 as outlined below:          Constitutional:  No acute distress, cooperative, sleeping    Resp:  CTA bilaterally. CV:  Regular rhythm, normal rate, no murmurs, gallops, rubs    GI:  Soft, non distended, non tender. normoactive bowel sounds,    Musculoskeletal/  Back:  No edema, warm, 2+ pulses throughout   ++tenderness midspinal in lower thoracic area     Neurologic:  drowsy         Data Review:    Review and/or order of clinical lab test      Labs:     Recent Labs     01/28/22  0118   WBC 11.7*   HGB 14.9   HCT 45.6        Recent Labs     01/28/22  0118   *   K 4.3      CO2 25   BUN 15   CREA 0.49*   *   CA 9.4     No results for input(s): ALT, AP, TBIL, TBILI, TP, ALB, GLOB, GGT, AML, LPSE in the last 72 hours.     No lab exists for component: SGOT, GPT, AMYP, HLPSE  No results for input(s): INR, PTP, APTT, INREXT, INREXT in the last 72 hours. No results for input(s): FE, TIBC, PSAT, FERR in the last 72 hours. No results found for: FOL, RBCF   No results for input(s): PH, PCO2, PO2 in the last 72 hours. No results for input(s): CPK, CKNDX, TROIQ in the last 72 hours.     No lab exists for component: CPKMB  No results found for: CHOL, CHOLX, CHLST, CHOLV, HDL, HDLP, LDL, LDLC, DLDLP, TGLX, TRIGL, TRIGP, CHHD, CHHDX  No results found for: GLUCPOC  No results found for: COLOR, APPRN, SPGRU, REFSG, MARIO, PROTU, GLUCU, KETU, BILU, UROU, ALLAN, LEUKU, GLUKE, EPSU, BACTU, WBCU, RBCU, CASTS, UCRY      Medications Reviewed:     Current Facility-Administered Medications   Medication Dose Route Frequency    traMADoL (ULTRAM) tablet 50 mg  50 mg Oral BID    polyethylene glycol (MIRALAX) packet 17 g  17 g Oral DAILY    haloperidoL (HALDOL) tablet 1 mg  1 mg Oral BID    acetaminophen (TYLENOL) tablet 650 mg  650 mg Oral Q8H    lidocaine 4 % patch 2 Patch  2 Patch TransDERmal Q24H    sodium chloride (NS) flush 5-40 mL  5-40 mL IntraVENous Q8H    sodium chloride (NS) flush 5-40 mL  5-40 mL IntraVENous PRN    acetaminophen (TYLENOL) tablet 650 mg  650 mg Oral Q4H PRN    morphine injection 1 mg  1 mg IntraVENous Q4H PRN    heparin (porcine) injection 5,000 Units  5,000 Units SubCUTAneous Q8H    hydrALAZINE (APRESOLINE) 20 mg/mL injection 10 mg  10 mg IntraVENous Q6H PRN     ______________________________________________________________________  EXPECTED LENGTH OF STAY: 3d 12h  ACTUAL LENGTH OF STAY:          6                 Kristine Groves MD

## 2022-01-31 ENCOUNTER — HOSPITAL ENCOUNTER (OUTPATIENT)
Dept: INTERVENTIONAL RADIOLOGY/VASCULAR | Age: 87
Discharge: HOME | DRG: 515 | End: 2022-01-31
Attending: HOSPITALIST
Payer: MEDICARE

## 2022-01-31 PROBLEM — M54.9 BACK PAIN: Status: ACTIVE | Noted: 2022-01-31

## 2022-01-31 LAB
ANION GAP SERPL CALC-SCNC: 2 MMOL/L (ref 5–15)
BASOPHILS # BLD: 0 K/UL (ref 0–0.1)
BASOPHILS NFR BLD: 1 % (ref 0–1)
BUN SERPL-MCNC: 13 MG/DL (ref 6–20)
BUN/CREAT SERPL: 30 (ref 12–20)
CALCIUM SERPL-MCNC: 9.3 MG/DL (ref 8.5–10.1)
CHLORIDE SERPL-SCNC: 96 MMOL/L (ref 97–108)
CO2 SERPL-SCNC: 33 MMOL/L (ref 21–32)
CREAT SERPL-MCNC: 0.44 MG/DL (ref 0.55–1.02)
DIFFERENTIAL METHOD BLD: ABNORMAL
EOSINOPHIL # BLD: 0.2 K/UL (ref 0–0.4)
EOSINOPHIL NFR BLD: 3 % (ref 0–7)
ERYTHROCYTE [DISTWIDTH] IN BLOOD BY AUTOMATED COUNT: 11.8 % (ref 11.5–14.5)
GLUCOSE BLD STRIP.AUTO-MCNC: 102 MG/DL (ref 65–117)
GLUCOSE SERPL-MCNC: 107 MG/DL (ref 65–100)
HCT VFR BLD AUTO: 42.5 % (ref 35–47)
HGB BLD-MCNC: 13.9 G/DL (ref 11.5–16)
IMM GRANULOCYTES # BLD AUTO: 0 K/UL (ref 0–0.04)
IMM GRANULOCYTES NFR BLD AUTO: 1 % (ref 0–0.5)
LYMPHOCYTES # BLD: 1.2 K/UL (ref 0.8–3.5)
LYMPHOCYTES NFR BLD: 20 % (ref 12–49)
MCH RBC QN AUTO: 33 PG (ref 26–34)
MCHC RBC AUTO-ENTMCNC: 32.7 G/DL (ref 30–36.5)
MCV RBC AUTO: 101 FL (ref 80–99)
MONOCYTES # BLD: 0.6 K/UL (ref 0–1)
MONOCYTES NFR BLD: 11 % (ref 5–13)
NEUTS SEG # BLD: 4 K/UL (ref 1.8–8)
NEUTS SEG NFR BLD: 64 % (ref 32–75)
NRBC # BLD: 0 K/UL (ref 0–0.01)
NRBC BLD-RTO: 0 PER 100 WBC
PLATELET # BLD AUTO: 234 K/UL (ref 150–400)
PMV BLD AUTO: 11 FL (ref 8.9–12.9)
POTASSIUM SERPL-SCNC: 4.8 MMOL/L (ref 3.5–5.1)
RBC # BLD AUTO: 4.21 M/UL (ref 3.8–5.2)
SERVICE CMNT-IMP: NORMAL
SODIUM SERPL-SCNC: 131 MMOL/L (ref 136–145)
WBC # BLD AUTO: 6.1 K/UL (ref 3.6–11)

## 2022-01-31 PROCEDURE — 77030021782 HC SYS CEM CART DEL KYPH -C

## 2022-01-31 PROCEDURE — 94760 N-INVAS EAR/PLS OXIMETRY 1: CPT

## 2022-01-31 PROCEDURE — 0PU43JZ SUPPLEMENT THORACIC VERTEBRA WITH SYNTHETIC SUBSTITUTE, PERCUTANEOUS APPROACH: ICD-10-PCS | Performed by: STUDENT IN AN ORGANIZED HEALTH CARE EDUCATION/TRAINING PROGRAM

## 2022-01-31 PROCEDURE — 82962 GLUCOSE BLOOD TEST: CPT

## 2022-01-31 PROCEDURE — 77030034842 HC TAMP SPN BN INFL EXP II KYPH -I

## 2022-01-31 PROCEDURE — 22513 PERQ VERTEBRAL AUGMENTATION: CPT

## 2022-01-31 PROCEDURE — 77030021783 HC SYS CEM DEL MEDT -D

## 2022-01-31 PROCEDURE — 80048 BASIC METABOLIC PNL TOTAL CA: CPT

## 2022-01-31 PROCEDURE — 74011000250 HC RX REV CODE- 250: Performed by: FAMILY MEDICINE

## 2022-01-31 PROCEDURE — 74011000250 HC RX REV CODE- 250: Performed by: STUDENT IN AN ORGANIZED HEALTH CARE EDUCATION/TRAINING PROGRAM

## 2022-01-31 PROCEDURE — 77030034843 HC TAMP SPN BN INFL EXP II KYPH -H

## 2022-01-31 PROCEDURE — 77030003666 HC NDL SPINAL BD -A

## 2022-01-31 PROCEDURE — 74011250637 HC RX REV CODE- 250/637: Performed by: HOSPITALIST

## 2022-01-31 PROCEDURE — 2709999900 HC NON-CHARGEABLE SUPPLY

## 2022-01-31 PROCEDURE — 85025 COMPLETE CBC W/AUTO DIFF WBC: CPT

## 2022-01-31 PROCEDURE — G0378 HOSPITAL OBSERVATION PER HR: HCPCS

## 2022-01-31 PROCEDURE — 36415 COLL VENOUS BLD VENIPUNCTURE: CPT

## 2022-01-31 PROCEDURE — C1713 ANCHOR/SCREW BN/BN,TIS/BN: HCPCS

## 2022-01-31 PROCEDURE — 74011250636 HC RX REV CODE- 250/636: Performed by: STUDENT IN AN ORGANIZED HEALTH CARE EDUCATION/TRAINING PROGRAM

## 2022-01-31 PROCEDURE — 94761 N-INVAS EAR/PLS OXIMETRY MLT: CPT

## 2022-01-31 PROCEDURE — 0PS43ZZ REPOSITION THORACIC VERTEBRA, PERCUTANEOUS APPROACH: ICD-10-PCS | Performed by: STUDENT IN AN ORGANIZED HEALTH CARE EDUCATION/TRAINING PROGRAM

## 2022-01-31 PROCEDURE — 74011000636 HC RX REV CODE- 636: Performed by: STUDENT IN AN ORGANIZED HEALTH CARE EDUCATION/TRAINING PROGRAM

## 2022-01-31 RX ORDER — LIDOCAINE HYDROCHLORIDE 20 MG/ML
20 INJECTION, SOLUTION INFILTRATION; PERINEURAL
Status: COMPLETED | OUTPATIENT
Start: 2022-01-31 | End: 2022-01-31

## 2022-01-31 RX ORDER — FENTANYL CITRATE 50 UG/ML
200 INJECTION, SOLUTION INTRAMUSCULAR; INTRAVENOUS
Status: DISCONTINUED | OUTPATIENT
Start: 2022-01-31 | End: 2022-01-31

## 2022-01-31 RX ORDER — KETOROLAC TROMETHAMINE 30 MG/ML
15 INJECTION, SOLUTION INTRAMUSCULAR; INTRAVENOUS
Status: COMPLETED | OUTPATIENT
Start: 2022-01-31 | End: 2022-01-31

## 2022-01-31 RX ORDER — BUPIVACAINE HYDROCHLORIDE 5 MG/ML
20 INJECTION, SOLUTION EPIDURAL; INTRACAUDAL
Status: COMPLETED | OUTPATIENT
Start: 2022-01-31 | End: 2022-01-31

## 2022-01-31 RX ORDER — FLUMAZENIL 0.1 MG/ML
0.5 INJECTION INTRAVENOUS
Status: DISCONTINUED | OUTPATIENT
Start: 2022-01-31 | End: 2022-01-31

## 2022-01-31 RX ORDER — SODIUM CHLORIDE 9 MG/ML
25 INJECTION, SOLUTION INTRAVENOUS
Status: DISCONTINUED | OUTPATIENT
Start: 2022-01-31 | End: 2022-01-31 | Stop reason: HOSPADM

## 2022-01-31 RX ORDER — NALOXONE HYDROCHLORIDE 0.4 MG/ML
0.4 INJECTION, SOLUTION INTRAMUSCULAR; INTRAVENOUS; SUBCUTANEOUS
Status: DISCONTINUED | OUTPATIENT
Start: 2022-01-31 | End: 2022-01-31

## 2022-01-31 RX ORDER — MIDAZOLAM HYDROCHLORIDE 1 MG/ML
5 INJECTION, SOLUTION INTRAMUSCULAR; INTRAVENOUS
Status: DISCONTINUED | OUTPATIENT
Start: 2022-01-31 | End: 2022-01-31

## 2022-01-31 RX ADMIN — BUPIVACAINE HYDROCHLORIDE 30 ML: 5 INJECTION, SOLUTION EPIDURAL; INTRACAUDAL; PERINEURAL at 14:17

## 2022-01-31 RX ADMIN — SODIUM CHLORIDE, PRESERVATIVE FREE 10 ML: 5 INJECTION INTRAVENOUS at 05:55

## 2022-01-31 RX ADMIN — FENTANYL CITRATE 50 MCG: 50 INJECTION INTRAMUSCULAR; INTRAVENOUS at 13:50

## 2022-01-31 RX ADMIN — LIDOCAINE HYDROCHLORIDE 15 ML: 20 INJECTION, SOLUTION INFILTRATION; PERINEURAL at 14:16

## 2022-01-31 RX ADMIN — FENTANYL CITRATE 50 MCG: 50 INJECTION INTRAMUSCULAR; INTRAVENOUS at 13:31

## 2022-01-31 RX ADMIN — ACETAMINOPHEN 650 MG: 325 TABLET ORAL at 21:29

## 2022-01-31 RX ADMIN — KETOROLAC TROMETHAMINE 15 MG: 30 INJECTION, SOLUTION INTRAMUSCULAR; INTRAVENOUS at 11:47

## 2022-01-31 RX ADMIN — CEFAZOLIN SODIUM 2 G: 1 INJECTION, POWDER, FOR SOLUTION INTRAMUSCULAR; INTRAVENOUS at 11:46

## 2022-01-31 RX ADMIN — ACETAMINOPHEN 650 MG: 325 TABLET ORAL at 05:55

## 2022-01-31 RX ADMIN — SODIUM CHLORIDE 25 ML/HR: 9 INJECTION, SOLUTION INTRAVENOUS at 11:37

## 2022-01-31 RX ADMIN — MIDAZOLAM 1 MG: 1 INJECTION INTRAMUSCULAR; INTRAVENOUS at 13:50

## 2022-01-31 RX ADMIN — SODIUM CHLORIDE, PRESERVATIVE FREE 10 ML: 5 INJECTION INTRAVENOUS at 21:29

## 2022-01-31 RX ADMIN — MIDAZOLAM 1 MG: 1 INJECTION INTRAMUSCULAR; INTRAVENOUS at 13:31

## 2022-01-31 RX ADMIN — SODIUM CHLORIDE, PRESERVATIVE FREE 10 ML: 5 INJECTION INTRAVENOUS at 05:56

## 2022-01-31 RX ADMIN — IOHEXOL 20 ML: 240 INJECTION, SOLUTION INTRATHECAL; INTRAVASCULAR; INTRAVENOUS; ORAL at 15:43

## 2022-01-31 NOTE — PROGRESS NOTES
1030: Encouraging pt to turn Q2 hrs. Pt refuses. Pt is able to turn self. Pt rather lay supine as it \"feels better on her back\". Will continue to encourage.

## 2022-01-31 NOTE — ACP (ADVANCE CARE PLANNING)
Advance Care Planning     Advance Care Planning (ACP) Physician/NP/PA Conversation      Date of Conversation: 1/24/2022  Conducted with: Patient with Decision Making Capacity    Healthcare Decision Maker:   No healthcare decision makers have been documented. Click here to complete [address] including selection of the Healthcare Decision Maker Relationship (ie \"Primary\")    Today we documented Decision Maker(s) consistent with Legal Next of Kin hierarchy. Care Preferences:    Hospitalization: \"If your health worsens and it becomes clear that your chance of recovery is unlikely, what would be your preference regarding hospitalization? \"  The patient would prefer hospitalization. Ventilation: \"If you were unable to breathe on your own and your chance of recovery was unlikely, what would be your preference about the use of a ventilator (breathing machine) if it was available to you? \"   The patient would desire the use of a ventilator. Resuscitation: \"In the event your heart stopped as a result of an underlying serious health condition, would you want attempts to be made to restart your heart, or would you prefer a natural death? \"   Yes, attempt to resuscitate.     Additional topics discussed: treatment goals    Conversation Outcomes / Follow-Up Plan:   ACP in process - information provided, considering goals and options  Reviewed DNR/DNI and patient elects Full Code (Attempt Resuscitation)     Length of Voluntary ACP Conversation in minutes:  12 minutes    Elva Mojica MD

## 2022-01-31 NOTE — H&P
Interventional and Vascular Radiology History and Physical    Patient: Magali Stack 80 y.o. female       Chief Complaint: T7/T9 fractures     History of Present Illness: 80year old female with acute traumatic T7/T9 vertebral body compression fractures presents for kyphoplasty. History:    Past Medical History:   Diagnosis Date    Atrial fibrillation, chronic (HCC)     Back pain     Sciatica     Spondylolysis      No family history on file. Social History     Socioeconomic History    Marital status:      Spouse name: Not on file    Number of children: Not on file    Years of education: Not on file    Highest education level: Not on file   Occupational History    Not on file   Tobacco Use    Smoking status: Never Smoker    Smokeless tobacco: Never Used   Substance and Sexual Activity    Alcohol use: Yes     Comment: occ    Drug use: Not Currently    Sexual activity: Not on file   Other Topics Concern    Not on file   Social History Narrative    Not on file     Social Determinants of Health     Financial Resource Strain:     Difficulty of Paying Living Expenses: Not on file   Food Insecurity:     Worried About Running Out of Food in the Last Year: Not on file    Sandy of Food in the Last Year: Not on file   Transportation Needs:     Lack of Transportation (Medical): Not on file    Lack of Transportation (Non-Medical):  Not on file   Physical Activity:     Days of Exercise per Week: Not on file    Minutes of Exercise per Session: Not on file   Stress:     Feeling of Stress : Not on file   Social Connections:     Frequency of Communication with Friends and Family: Not on file    Frequency of Social Gatherings with Friends and Family: Not on file    Attends Buddhism Services: Not on file    Active Member of Clubs or Organizations: Not on file    Attends Club or Organization Meetings: Not on file    Marital Status: Not on file   Intimate Partner Violence:     Fear of Current or Ex-Partner: Not on file    Emotionally Abused: Not on file    Physically Abused: Not on file    Sexually Abused: Not on file   Housing Stability:     Unable to Pay for Housing in the Last Year: Not on file    Number of Places Lived in the Last Year: Not on file    Unstable Housing in the Last Year: Not on file       Allergies: No Known Allergies    Current Medications:  Current Facility-Administered Medications   Medication Dose Route Frequency    lidocaine (XYLOCAINE) 20 mg/mL (2 %) injection 400 mg  20 mL SubCUTAneous RAD ONCE    bupivacaine (PF) (MARCAINE) 0.5 % (5 mg/mL) injection 100 mg  20 mL Epidural RAD ONCE    iohexoL (OMNIPAQUE) 240 mg iodine/mL solution 50 mL  50 mL Other RAD ONCE     No current outpatient medications on file.      Facility-Administered Medications Ordered in Other Encounters   Medication Dose Route Frequency    flumazeniL (ROMAZICON) 0.1 mg/mL injection 0.5 mg  0.5 mg IntraVENous RAD PRN    midazolam (VERSED) injection 5 mg  5 mg IntraVENous Rad Multiple    fentaNYL citrate (PF) injection 200 mcg  200 mcg IntraVENous Rad Multiple    naloxone (NARCAN) injection 0.4 mg  0.4 mg IntraVENous RAD PRN    0.9% sodium chloride infusion  25 mL/hr IntraVENous RAD CONTINUOUS    traMADoL (ULTRAM) tablet 50 mg  50 mg Oral BID    polyethylene glycol (MIRALAX) packet 17 g  17 g Oral DAILY    haloperidoL (HALDOL) tablet 1 mg  1 mg Oral BID    acetaminophen (TYLENOL) tablet 650 mg  650 mg Oral Q8H    lidocaine 4 % patch 2 Patch  2 Patch TransDERmal Q24H    sodium chloride (NS) flush 5-40 mL  5-40 mL IntraVENous Q8H    sodium chloride (NS) flush 5-40 mL  5-40 mL IntraVENous PRN    acetaminophen (TYLENOL) tablet 650 mg  650 mg Oral Q4H PRN    morphine injection 1 mg  1 mg IntraVENous Q4H PRN    [Held by provider] heparin (porcine) injection 5,000 Units  5,000 Units SubCUTAneous Q8H    hydrALAZINE (APRESOLINE) 20 mg/mL injection 10 mg  10 mg IntraVENous Q6H PRN        Physical Exam:  There were no vitals taken for this visit. No acute distress  Good peripheral perfusion  Nonlabored respirations  Abdomen nondistended  Procedure site back without abnormalities     Alerts:    Hospital Problems  Date Reviewed: 1/31/2022    None          Laboratory:      Recent Labs     01/31/22  0554   HGB 13.9   HCT 42.5   WBC 6.1      BUN 13   CREA 0.44*   K 4.8         Plan of Care/Planned Procedure:  Risks, benefits, and alternatives reviewed with patient and she agrees to proceed with the procedure. Conscious sedation will be performed with IV fentanyl and versed.        Sven Orellana MD

## 2022-01-31 NOTE — PROGRESS NOTES
Chart reviewed and noted patient CARL in [facility] at this time. Will follow up for OT intervention as able. Thank you.

## 2022-01-31 NOTE — PROGRESS NOTES
Virtual reviewer communicated change to CM, which reflect outpatient observation order written prior to Written discharge order. Code 44 delivered and given to patient. CM met with the patient and provided to the patient the printed information about her outpatient observation status. The patient was given the flyer entitled, \"Medicare Outpatient Observation: Information & notification. \" All questions were answered, no additional discharge needs identified at this time and patient expects to return to their (home, assisted living facility, relatives home, etc.) after discharge today. Copy provided to patient or sent secure email, secure fax , or certified mail to patient's loved one per their request.    *Reviewed and delivered to daughter Tarik Garrett who is in patient's room.  Patient currently CARL*    Nathan Rosario MS

## 2022-01-31 NOTE — PROGRESS NOTES
Pt arrives via bed to angio department accompanied by transporter for kyphoplasty procedure. All assessments completed and consent was reviewed. Education given was regarding procedure, conscious sedation, post-procedure care and  management/follow-up. Opportunity for questions was provided and all questions and concerns were addressed.

## 2022-01-31 NOTE — PROGRESS NOTES
TRANSFER - OUT REPORT:    Verbal report given to More GHOSH(name) on Lenore Gatica  being transferred to (unit) for routine post - op       Report consisted of patients Situation, Background, Assessment and   Recommendations(SBAR). Information from the following report(s) SBAR and Procedure Summary was reviewed with the receiving nurse. Lines:   Peripheral IV 01/26/22 Anterior; Left Forearm (Active)   Site Assessment Clean, dry, & intact 01/31/22 0800   Phlebitis Assessment 0 01/31/22 0800   Infiltration Assessment 0 01/31/22 0800   Dressing Status Clean, dry, & intact 01/31/22 0800   Dressing Type Tape;Transparent 01/31/22 0800   Hub Color/Line Status Blue;Capped 01/31/22 0800   Action Taken Open ports on tubing capped 01/31/22 0800   Alcohol Cap Used Yes 01/31/22 0800       Peripheral IV 01/31/22 Anterior;Right Forearm (Active)        Opportunity for questions and clarification was provided.       Patient transported with:   Transporter

## 2022-01-31 NOTE — PROGRESS NOTES
[facility]  Hospitalist Group                                                                                          Hospitalist Progress Note  Snow Merida MD  Answering service: [phone] OR [phone] from in house phone        Date of Service:  2022  NAME:  Dasia Storm  :  1935  MRN:  [id number]      Admission Summary:   History was primarily obtained from the patient's daughter, patient is alert, is somewhat confused, suspect underlying dementia, patient apparently presented with back pain that started a few days back, patient reports that she is not sure whether she had a fall or not but as far she remembers she has not had any falls or injuries, patient reports that it is hard for her to walk because she continues to have significant pain, came to the ER yesterday, who was found to have multiple thoracic compression fractures, the daughter reports that she has had history of thoracic compression fractures in the past, the daughter also reports that patient is having some shortness of breath on exertion, no fever chills, has had received vaccine focal    Interval history / Subjective:   F/u compression fractures  Awake and alert  On room air  Daughter at bedside  Pain controlled      Assessment & Plan:     Acute T7 and T9 compression fx with pain  Upper and lower back pain  -Evaluated by neurosurgery. No indication for acute neurosurgery at this time. - worsening of pain , MRI T spine reviewed  - d/w Neurosurgery , recommends IR consult for Kyphoplasty  - IR consulted   -PT OT to evaluate patient  - pain control with Tylenol  - d/c Oxy, start Tramadol BID for excessive drowsiness  - bowel regimen    Mild hyponatremia: improving    Chronic diastolic CHF  -Euvolemic and not acutely decompensated. -Can follow with cardiology outpatient.     Possible moderately advanced Dementia  - Neuro eval noted, likely has underlying moderate dementia, needs formal eval in outpatient setting.   - not safe for discharge home, PT recommends SNF, will get OT eval    Code status: full code, confirmed with the patient and daughter  DVT prophylaxis: heparin    Care Plan discussed with: Patient/Family  Anticipated Disposition: Home w/Family  Anticipated Discharge: 24 hours to 48 hours, SNF on discharge      Hospital Problems  Date Reviewed: 1/31/2022          Codes Class Noted POA    * (Principal) Back pain ICD-10-CM: M54.9  ICD-9-CM: 724.5  1/31/2022 Yes        CHF (congestive heart failure) (HCC) ICD-10-CM: I50.9  ICD-9-CM: 428.0  1/24/2022 Unknown                Review of Systems:   A comprehensive review of systems was negative except for that written in the HPI. Vital Signs:    Last 24hrs VS reviewed since prior progress note. Most recent are:  Visit Vitals  BP (!) 145/88 (BP 1 Location: Right upper arm, BP Patient Position: At rest;Lying)   Pulse 61   Temp 98 °F (36.7 °C)   Resp 21   Ht 5' 7\" (1.702 m)   Wt 47.2 kg (104 lb)   SpO2 93%   BMI 16.29 kg/m²       No intake or output data in the 24 hours ending 01/31/22 1018     Physical Examination:     I had a face to face encounter with this patient and independently examined them on 1/31/2022 as outlined below:          Constitutional:  No acute distress, cooperative, sleeping    Resp:  CTA bilaterally. CV:  Regular rhythm, normal rate, no murmurs, gallops, rubs    GI:  Soft, non distended, non tender.  normoactive bowel sounds,    Musculoskeletal/  Back:  No edema, warm, 2+ pulses throughout   ++tenderness midspinal in lower thoracic area     Neurologic:  drowsy         Data Review:    Review and/or order of clinical lab test      Labs:     Recent Labs     01/31/22  0554   WBC 6.1   HGB 13.9   HCT 42.5        Recent Labs     01/31/22  0554   *   K 4.8   CL 96*   CO2 33*   BUN 13   CREA 0.44*   *   CA 9.3     No results for input(s): ALT, AP, TBIL, TBILI, TP, ALB, GLOB, GGT, AML, LPSE in the last 72 hours.    No lab exists for component: SGOT, GPT, AMYP, HLPSE  No results for input(s): INR, PTP, APTT, INREXT, INREXT in the last 72 hours. No results for input(s): FE, TIBC, PSAT, FERR in the last 72 hours. No results found for: FOL, RBCF   No results for input(s): PH, PCO2, PO2 in the last 72 hours. No results for input(s): CPK, CKNDX, TROIQ in the last 72 hours.     No lab exists for component: CPKMB  No results found for: CHOL, CHOLX, CHLST, CHOLV, HDL, HDLP, LDL, LDLC, DLDLP, TGLX, TRIGL, TRIGP, CHHD, CHHDX  No results found for: GLUCPOC  No results found for: COLOR, APPRN, SPGRU, REFSG, MARIO, PROTU, GLUCU, KETU, BILU, UROU, ALLAN, LEUKU, GLUKE, EPSU, BACTU, WBCU, RBCU, CASTS, UCRY      Medications Reviewed:     Current Facility-Administered Medications   Medication Dose Route Frequency    traMADoL (ULTRAM) tablet 50 mg  50 mg Oral BID    polyethylene glycol (MIRALAX) packet 17 g  17 g Oral DAILY    haloperidoL (HALDOL) tablet 1 mg  1 mg Oral BID    acetaminophen (TYLENOL) tablet 650 mg  650 mg Oral Q8H    lidocaine 4 % patch 2 Patch  2 Patch TransDERmal Q24H    sodium chloride (NS) flush 5-40 mL  5-40 mL IntraVENous Q8H    sodium chloride (NS) flush 5-40 mL  5-40 mL IntraVENous PRN    acetaminophen (TYLENOL) tablet 650 mg  650 mg Oral Q4H PRN    morphine injection 1 mg  1 mg IntraVENous Q4H PRN    [Held by provider] heparin (porcine) injection 5,000 Units  5,000 Units SubCUTAneous Q8H    hydrALAZINE (APRESOLINE) 20 mg/mL injection 10 mg  10 mg IntraVENous Q6H PRN     ______________________________________________________________________  EXPECTED LENGTH OF STAY: 3d 12h  ACTUAL LENGTH OF STAY:          Jennifer Maurer MD

## 2022-01-31 NOTE — PROGRESS NOTES
Physical Therapy - defer  Chart reviewed in prep for therapy session. Noted patient CARL in [facility] at this time. Will check back later as able.

## 2022-02-01 PROCEDURE — 94761 N-INVAS EAR/PLS OXIMETRY MLT: CPT

## 2022-02-01 PROCEDURE — G0378 HOSPITAL OBSERVATION PER HR: HCPCS

## 2022-02-01 PROCEDURE — 65660000000 HC RM CCU STEPDOWN

## 2022-02-01 PROCEDURE — 74011000250 HC RX REV CODE- 250: Performed by: FAMILY MEDICINE

## 2022-02-01 PROCEDURE — 74011250636 HC RX REV CODE- 250/636: Performed by: NURSE PRACTITIONER

## 2022-02-01 PROCEDURE — 74011250636 HC RX REV CODE- 250/636: Performed by: FAMILY MEDICINE

## 2022-02-01 PROCEDURE — 74011250637 HC RX REV CODE- 250/637: Performed by: FAMILY MEDICINE

## 2022-02-01 PROCEDURE — 96372 THER/PROPH/DIAG INJ SC/IM: CPT

## 2022-02-01 PROCEDURE — 65390000012 HC CONDITION CODE 44 OBSERVATION

## 2022-02-01 PROCEDURE — 74011250637 HC RX REV CODE- 250/637: Performed by: HOSPITALIST

## 2022-02-01 RX ORDER — HALOPERIDOL 5 MG/ML
2 INJECTION INTRAMUSCULAR ONCE
Status: COMPLETED | OUTPATIENT
Start: 2022-02-01 | End: 2022-02-01

## 2022-02-01 RX ORDER — LORAZEPAM 2 MG/ML
0.5 INJECTION INTRAMUSCULAR ONCE
Status: COMPLETED | OUTPATIENT
Start: 2022-02-01 | End: 2022-02-01

## 2022-02-01 RX ORDER — OLANZAPINE 2.5 MG/1
2.5 TABLET ORAL EVERY EVENING
Status: DISCONTINUED | OUTPATIENT
Start: 2022-02-01 | End: 2022-02-06 | Stop reason: HOSPADM

## 2022-02-01 RX ORDER — LORAZEPAM 2 MG/ML
0.5 INJECTION INTRAMUSCULAR ONCE
Status: DISCONTINUED | OUTPATIENT
Start: 2022-02-01 | End: 2022-02-01

## 2022-02-01 RX ADMIN — HALOPERIDOL 1 MG: 1 TABLET ORAL at 18:33

## 2022-02-01 RX ADMIN — HEPARIN SODIUM 5000 UNITS: 5000 INJECTION INTRAVENOUS; SUBCUTANEOUS at 20:30

## 2022-02-01 RX ADMIN — ACETAMINOPHEN 650 MG: 325 TABLET ORAL at 05:00

## 2022-02-01 RX ADMIN — HALOPERIDOL LACTATE 2 MG: 5 INJECTION, SOLUTION INTRAMUSCULAR at 04:40

## 2022-02-01 RX ADMIN — LORAZEPAM 0.5 MG: 2 INJECTION INTRAMUSCULAR; INTRAVENOUS at 05:25

## 2022-02-01 RX ADMIN — SODIUM CHLORIDE, PRESERVATIVE FREE 10 ML: 5 INJECTION INTRAVENOUS at 22:21

## 2022-02-01 RX ADMIN — ACETAMINOPHEN 650 MG: 325 TABLET ORAL at 18:33

## 2022-02-01 NOTE — PROGRESS NOTES
Occupational Therapy  2/1/2022    Chart reviewed. Pt is post op day 1 from T7 and T9 kyphoplasty. Noted pt with code atlas early this AM and currently in B wrist restraints. Pt received IM Haldol and Ativan. Pt drowsy at this time. Will follow for OT once pt is more awake and cooperative. Thank you.  Ailin Mcpherson, OT

## 2022-02-01 NOTE — PROGRESS NOTES
Pt attempting to get out of bed, tried to reorient patient but she was verbally aggressive. She took out her own IV as well, stating \"I don't need this anymore\". Will continue to monitor.

## 2022-02-01 NOTE — ADT AUTH CERT NOTES
PREVIOUSLY DENIED FOR INPATIENT DOWNGRADED TO OBSERVATION  IP REF #[id number]        PLEASE FAX FORM OR CALL BACK TO NOTIFY IF  AUTHORIZATION FOR OBSERVATION IS OR IS NOT REQUIRED  PHONE # [phone] FAX # [phone]    ADT ORDER  01/31/22 1511  INITIAL PHYSICIAN ORDER: OBSERVATION/OUTPATIENT IN A BED OBSERVATION; Telemetry; Yes; back pain  (ADMISSION ORDERS)  ONE TIME        Authorizing Provider: Lexis Schwartz MD    User who entered the order: Lexis Schwartz MD       Question Answer Comment   Patient Class: OBSERVATION    Type of Bed Telemetry    Cardiac Monitoring Required?  Yes    Reason for Observation back pain    Admitting Diagnosis Back pain    Admitting Physician Freddy Cason    Attending Physician [facility] [address]        01/31/22 1516

## 2022-02-01 NOTE — PROGRESS NOTES
Code atlas called on patient after she was repeatedly attempting to get out of bed, despite efforts to reorient her. Pt once again became extremely verbally & physically aggressive, screaming & swinging at staff. CHANA Iglesias placed orders for IM haldol & ativan. Patient placed in soft bilateral wrist restraints. This RN attempted to call pt's daughter Ashli Lynn, but no answer & voicemail box was full. Will continue to monitor.

## 2022-02-01 NOTE — PROGRESS NOTES
Transition of Care  1. RUR 13% low  2. Disposition SNF rehab-pending choice/acceptance  3. DME  None  4. Transportation  BLS  5. Follow Up PCP, Specialist      CM received call from patient's . CM discussed DC recommendations for SNF rehab. [facility] do not have any available beds. CM encouraged  to further discuss with patient/children as additional SNF choices are needed.  asking if patient could DC home with him. However,  uses a walker/rollator and admits that he would not be able to physically assist patient much at all without any additional help-he has multiple myeloma and neuropathy. CM placed follow up call patient's daughter Amy Smallwood with updates. Amy Smallwood requesting for CM to send additional referral to [facility]. CM to monitor.     Tra Avitia MS

## 2022-02-01 NOTE — PROGRESS NOTES
[facility]  Hospitalist Group                                                                                          Hospitalist Progress Note  Payton Bowens MD  Answering service: [phone] OR [phone] from in house phone        Date of Service:  2022  NAME:  Ciera Hernandes  :  1935  MRN:  [id number]      Admission Summary:   History was primarily obtained from the patient's daughter, patient is alert, is somewhat confused, suspect underlying dementia, patient apparently presented with back pain that started a few days back, patient reports that she is not sure whether she had a fall or not but as far she remembers she has not had any falls or injuries, patient reports that it is hard for her to walk because she continues to have significant pain, came to the ER yesterday, who was found to have multiple thoracic compression fractures, the daughter reports that she has had history of thoracic compression fractures in the past, the daughter also reports that patient is having some shortness of breath on exertion, no fever chills, has had received vaccine focal    Interval history / Subjective:   F/u compression fractures  Overnight reportedly the patient was confused and agitated when was given Ativan/haldol   On room air  Pain controlled      Assessment & Plan:     Acute T7 and T9 compression fx with pain  Upper and lower back pain  -Evaluated by neurosurgery. No indication for acute neurosurgery at this time. - worsening of pain , MRI T spine reviewed  - d/w Neurosurgery , recommends Kyphoplasty  - s/p kypho  -PT OT to evaluate patient  - pain control with Tylenol  - d/c Oxy, start Tramadol BID for excessive drowsiness  - bowel regimen    Mild hyponatremia: improving    Chronic diastolic CHF  -Euvolemic and not acutely decompensated. -Can follow with cardiology outpatient.     Possible moderately advanced Dementia  - Neuro eval noted, likely has underlying moderate dementia, needs formal eval in outpatient setting.   - not safe for discharge home, PT recommends SNF. Awaiting re-eval after kypho    Code status: full code, confirmed with the patient and daughter  DVT prophylaxis: heparin    Plan: When the patient is more awake will try to take the patient off restraints for possibly discharging her soon to [address] discussed with: Patient/Family  Anticipated Disposition: Home w/Family vs SNF  Anticipated Discharge: likely today home vs SNF     Hospital Problems  Date Reviewed: 1/31/2022          Codes Class Noted POA    * (Principal) Back pain ICD-10-CM: M54.9  ICD-9-CM: 724.5  1/31/2022 Yes        CHF (congestive heart failure) ([facility] [address] ICD-10-CM: I50.9  ICD-9-CM: 428.0  1/24/2022 Unknown                Review of Systems:   A comprehensive review of systems was negative except for that written in the HPI. Vital Signs:    Last 24hrs VS reviewed since prior progress note. Most recent are:  Visit Vitals  /86 (BP 1 Location: Right upper arm, BP Patient Position: At rest)   Pulse 70   Temp 97.5 °F (36.4 °C)   Resp 22   Ht 5' 7\" (1.702 m)   Wt 47.4 kg (104 lb 8 oz)   SpO2 95%   BMI 16.37 kg/m²       No intake or output data in the 24 hours ending 02/01/22 0800     Physical Examination:     I had a face to face encounter with this patient and independently examined them on 2/1/2022 as outlined below:          Constitutional:  No acute distress, cooperative, sleeping    Resp:  CTA bilaterally. CV:  Regular rhythm, normal rate, no murmurs, gallops, rubs    GI:  Soft, non distended, non tender.  normoactive bowel sounds,    Musculoskeletal/  Back:  No edema, warm, 2+ pulses throughout   ++tenderness midspinal in lower thoracic area     Neurologic:  drowsy         Data Review:    Review and/or order of clinical lab test      Labs:     Recent Labs     01/31/22  0554   WBC 6.1   HGB 13.9   HCT 42.5        Recent Labs     01/31/22  0554   *   K 4.8   CL 96* CO2 33*   BUN 13   CREA 0.44*   *   CA 9.3     No results for input(s): ALT, AP, TBIL, TBILI, TP, ALB, GLOB, GGT, AML, LPSE in the last 72 hours. No lab exists for component: SGOT, GPT, AMYP, HLPSE  No results for input(s): INR, PTP, APTT, INREXT, INREXT in the last 72 hours. No results for input(s): FE, TIBC, PSAT, FERR in the last 72 hours. No results found for: FOL, RBCF   No results for input(s): PH, PCO2, PO2 in the last 72 hours. No results for input(s): CPK, CKNDX, TROIQ in the last 72 hours.     No lab exists for component: CPKMB  No results found for: CHOL, CHOLX, CHLST, CHOLV, HDL, HDLP, LDL, LDLC, DLDLP, TGLX, TRIGL, TRIGP, CHHD, CHHDX  Lab Results   Component Value Date/Time    Glucose (POC) 102 01/31/2022 01:15 PM     No results found for: COLOR, APPRN, SPGRU, REFSG, MARIO, PROTU, GLUCU, KETU, BILU, UROU, ALLAN, LEUKU, GLUKE, EPSU, BACTU, WBCU, RBCU, CASTS, UCRY      Medications Reviewed:     Current Facility-Administered Medications   Medication Dose Route Frequency    traMADoL (ULTRAM) tablet 50 mg  50 mg Oral BID    polyethylene glycol (MIRALAX) packet 17 g  17 g Oral DAILY    haloperidoL (HALDOL) tablet 1 mg  1 mg Oral BID    acetaminophen (TYLENOL) tablet 650 mg  650 mg Oral Q8H    lidocaine 4 % patch 2 Patch  2 Patch TransDERmal Q24H    sodium chloride (NS) flush 5-40 mL  5-40 mL IntraVENous Q8H    sodium chloride (NS) flush 5-40 mL  5-40 mL IntraVENous PRN    acetaminophen (TYLENOL) tablet 650 mg  650 mg Oral Q4H PRN    morphine injection 1 mg  1 mg IntraVENous Q4H PRN    [Held by provider] heparin (porcine) injection 5,000 Units  5,000 Units SubCUTAneous Q8H    hydrALAZINE (APRESOLINE) 20 mg/mL injection 10 mg  10 mg IntraVENous Q6H PRN     ______________________________________________________________________  EXPECTED LENGTH OF STAY: 3d 12h  ACTUAL LENGTH OF STAY:          Joel Zavala MD

## 2022-02-01 NOTE — PROGRESS NOTES
Comprehensive Nutrition Assessment    Type and Reason for Visit: Reassess    Nutrition Recommendations/Plan:      1. Continue with 2gm Na+ diet order. 2. Continue with chocolate Ensure Enlive (high calorie, high protein) TID. Nutrition Assessment:    79 yo female admitted for CHF. PMhx: Afib, back pain, spondylolysis, suspected underlying dementia. Found to have multiple thoracic compression fractures. No surgery plan indicated at this time.     Underweight per BMI with visible muscle wasting and SQ fat loss. Pt states she has always been thin and unable to gain weight but she denies any weight loss. Weight hx in EMR confirms stable weight. Pt states she has a fair appetite at home, eats 3 meals/day plus 3 Ensure shakes or milk mixed with protein powder. Assisted pt with eating breakfast, able to feed herself but needed help/encouragement to set up and start eating. Labs reviewed.     2/1: F/u. Pt now very confused and upset. In wrist restraints and not happy about that. She was unable to provide info on her intakes the past couple of days. Intakes documented as 1-50% meals. Continues to state that she likes Ensure. Unfavorable weight loss noted since admission. Labs: Na+ 131      Malnutrition Assessment:  Malnutrition Status:  Severe malnutrition    Context:  Chronic illness     Findings of the 6 clinical characteristics of malnutrition:   Energy Intake:  No significant decrease in energy intake  Weight Loss:  No significant weight loss     Body Fat Loss:  7 - Severe body fat loss, Buccal region,Orbital   Muscle Mass Loss:  7 - Severe muscle mass loss, Temples (temporalis),Hand (interosseous),Clavicles (pectoralis &deltoids)  Fluid Accumulation:  No significant fluid accumulation,     Strength:  Not performed         Nutritionally Significant Medications: reviewed    Estimated Daily Nutrient Needs:  Energy (kcal): 1987-5311 (33-36 kcals/kg);  Weight Used for Energy Requirements: Current  Protein (g): 73-83 (1.4-1.6 gm/kg); Weight Used for Protein Requirements: Current  Fluid (ml/day): 8175-0553; Method Used for Fluid Requirements: 1 ml/kcal    Nutrition Related Findings:       BM: unsure  Edema: none  Wounds:  None       Current Nutrition Therapies:   Diet: 2gm Na+  Supplements: Ensure Enlive TID  Additional Caloric Sources: none    Meal intake: Patient Vitals for the past 168 hrs:   % Diet Eaten   02/01/22 1255 0%   01/30/22 1006 1 - 25%   01/26/22 1630 26 - 50%     Supplement Intake: No data found.     Anthropometric Measures:  · Height:  5' 7\" (170.2 cm)  · Current Body Wt:  47.4 kg (104 lb 8 oz)   · Admission Body Wt:       · Usual Body Wt:        · Ideal Body Wt:  135:  84.4 %   · Adjusted Body Weight:   ; Weight Adjustment for: No adjustment   · Adjusted BMI:       · BMI Categories:  Underweight (BMI less than 22) age over 72     Wt Readings from Last 10 Encounters:   02/01/22 47.4 kg (104 lb 8 oz)   02/24/20 51.9 kg (114 lb 6.7 oz)       Nutrition Diagnosis:   · Underweight related to inadequate protein-energy intake as evidenced by  (16.4)      Nutrition Interventions:   Food and/or Nutrient Delivery: Continue current diet,Continue oral nutrition supplement  Nutrition Education and Counseling: No recommendations at this time  Coordination of Nutrition Care: Continue to monitor while inpatient    Goals:  Consume > 50% meals + 2-3 ONS each day to promote weight gain of 0.5-1lb within next 5-7 days       Nutrition Monitoring and Evaluation:   Behavioral-Environmental Outcomes: None identified  Food/Nutrient Intake Outcomes: Food and nutrient intake,Supplement intake  Physical Signs/Symptoms Outcomes: Biochemical data,GI status,Weight    Discharge Planning:    Continue current diet,Continue oral nutrition supplement     Ellouise Goltz, RD  Contact via [facility]

## 2022-02-02 PROCEDURE — 94761 N-INVAS EAR/PLS OXIMETRY MLT: CPT

## 2022-02-02 PROCEDURE — 97116 GAIT TRAINING THERAPY: CPT

## 2022-02-02 PROCEDURE — 74011250637 HC RX REV CODE- 250/637: Performed by: HOSPITALIST

## 2022-02-02 PROCEDURE — 74011000250 HC RX REV CODE- 250: Performed by: FAMILY MEDICINE

## 2022-02-02 PROCEDURE — 65660000000 HC RM CCU STEPDOWN

## 2022-02-02 PROCEDURE — 97535 SELF CARE MNGMENT TRAINING: CPT

## 2022-02-02 PROCEDURE — 97168 OT RE-EVAL EST PLAN CARE: CPT

## 2022-02-02 PROCEDURE — 94760 N-INVAS EAR/PLS OXIMETRY 1: CPT

## 2022-02-02 PROCEDURE — 74011250636 HC RX REV CODE- 250/636: Performed by: FAMILY MEDICINE

## 2022-02-02 PROCEDURE — 97530 THERAPEUTIC ACTIVITIES: CPT

## 2022-02-02 PROCEDURE — 74011250637 HC RX REV CODE- 250/637: Performed by: FAMILY MEDICINE

## 2022-02-02 RX ADMIN — HEPARIN SODIUM 5000 UNITS: 5000 INJECTION INTRAVENOUS; SUBCUTANEOUS at 21:15

## 2022-02-02 RX ADMIN — ACETAMINOPHEN 650 MG: 325 TABLET ORAL at 21:15

## 2022-02-02 RX ADMIN — TRAMADOL HYDROCHLORIDE 50 MG: 50 TABLET, COATED ORAL at 18:37

## 2022-02-02 RX ADMIN — ACETAMINOPHEN 650 MG: 325 TABLET ORAL at 18:37

## 2022-02-02 RX ADMIN — HEPARIN SODIUM 5000 UNITS: 5000 INJECTION INTRAVENOUS; SUBCUTANEOUS at 04:23

## 2022-02-02 RX ADMIN — SODIUM CHLORIDE, PRESERVATIVE FREE 10 ML: 5 INJECTION INTRAVENOUS at 21:15

## 2022-02-02 NOTE — PROGRESS NOTES
[facility] Adult  Hospitalist Group                                                                                          Hospitalist Progress Note  Ralph George MD  Answering service: [phone] OR [phone] from in house phone        Date of Service:  2022  NAME:  Yoli Cheung  :  1935  MRN:  [id number]      Admission Summary:   History was primarily obtained from the patient's daughter, patient is alert, is somewhat confused, suspect underlying dementia, patient apparently presented with back pain that started a few days back, patient reports that she is not sure whether she had a fall or not but as far she remembers she has not had any falls or injuries, patient reports that it is hard for her to walk because she continues to have significant pain, came to the ER yesterday, who was found to have multiple thoracic compression fractures, the daughter reports that she has had history of thoracic compression fractures in the past, the daughter also reports that patient is having some shortness of breath on exertion, no fever chills, has had received vaccine focal    Interval history / Subjective:   F/u compression fractures  Overnight reportedly the patient was confused and agitated when was given Ativan/haldol   On room air  Pain controlled      Assessment & Plan:     Acute T7 and T9 compression fx with pain  Upper and lower back pain  -Evaluated by neurosurgery. No indication for acute neurosurgery at this time. - worsening of pain , MRI T spine reviewed  - d/w Neurosurgery , recommends Kyphoplasty  - s/p kypho  -PT OT to evaluate patient  - pain control with Tylenol  - On Tramadol BID for excessive drowsiness  - bowel regimen    Mild hyponatremia: improving    Chronic diastolic CHF  -Euvolemic and not acutely decompensated. -Can follow with cardiology outpatient.     Possible moderately advanced Dementia  - Neuro eval noted, likely has underlying moderate dementia, needs formal eval in outpatient setting.   - not safe for discharge home, PT recommends SNF. Awaiting re-eval after kypho    Code status: full code, confirmed with the patient and daughter  DVT prophylaxis: heparin    Plan: When the patient is more awake will try to take the patient off restraints for possibly discharging her soon to [address] discussed with: Patient/Family  Anticipated Disposition: Home w/Family vs SNF  Anticipated Discharge: likely today home vs SNF     Hospital Problems  Date Reviewed: 1/31/2022          Codes Class Noted POA    * (Principal) Back pain ICD-10-CM: M54.9  ICD-9-CM: 724.5  1/31/2022 Yes        CHF (congestive heart failure) ([facility] [address] ICD-10-CM: I50.9  ICD-9-CM: 428.0  1/24/2022 Unknown                Review of Systems:   A comprehensive review of systems was negative except for that written in the HPI. Vital Signs:    Last 24hrs VS reviewed since prior progress note. Most recent are:  Visit Vitals  BP (!) 156/70 (BP 1 Location: Right leg, BP Patient Position: At rest)   Pulse 63   Temp 97.9 °F (36.6 °C)   Resp 18   Ht 5' 7\" (1.702 m)   Wt 46.7 kg (102 lb 14.4 oz)   SpO2 93%   BMI 16.12 kg/m²       No intake or output data in the 24 hours ending 02/02/22 0904     Physical Examination:     I had a face to face encounter with this patient and independently examined them on 2/2/2022 as outlined below:          Constitutional:  No acute distress, cooperative, sleeping    Resp:  CTA bilaterally. CV:  Regular rhythm, normal rate, no murmurs, gallops, rubs    GI:  Soft, non distended, non tender.  normoactive bowel sounds,    Musculoskeletal/  Back:  No edema, warm, 2+ pulses throughout   ++tenderness midspinal in lower thoracic area     Neurologic:  drowsy         Data Review:    Review and/or order of clinical lab test      Labs:     Recent Labs     01/31/22  0554   WBC 6.1   HGB 13.9   HCT 42.5        Recent Labs     01/31/22  0554   *   K 4.8   CL 96*   CO2 33*   BUN 13 CREA 0.44*   *   CA 9.3     No results for input(s): ALT, AP, TBIL, TBILI, TP, ALB, GLOB, GGT, AML, LPSE in the last 72 hours. No lab exists for component: SGOT, GPT, AMYP, HLPSE  No results for input(s): INR, PTP, APTT, INREXT, INREXT in the last 72 hours. No results for input(s): FE, TIBC, PSAT, FERR in the last 72 hours. No results found for: FOL, RBCF   No results for input(s): PH, PCO2, PO2 in the last 72 hours. No results for input(s): CPK, CKNDX, TROIQ in the last 72 hours.     No lab exists for component: CPKMB  No results found for: CHOL, CHOLX, CHLST, CHOLV, HDL, HDLP, LDL, LDLC, DLDLP, TGLX, TRIGL, TRIGP, CHHD, CHHDX  Lab Results   Component Value Date/Time    Glucose (POC) 102 01/31/2022 01:15 PM     No results found for: COLOR, APPRN, SPGRU, REFSG, MARIO, PROTU, GLUCU, KETU, BILU, UROU, ALLAN, LEUKU, GLUKE, EPSU, BACTU, WBCU, RBCU, CASTS, UCRY      Medications Reviewed:     Current Facility-Administered Medications   Medication Dose Route Frequency    [Held by provider] OLANZapine (ZyPREXA) tablet 2.5 mg  2.5 mg Oral QPM    traMADoL (ULTRAM) tablet 50 mg  50 mg Oral BID    polyethylene glycol (MIRALAX) packet 17 g  17 g Oral DAILY    [Held by provider] haloperidoL (HALDOL) tablet 1 mg  1 mg Oral BID    acetaminophen (TYLENOL) tablet 650 mg  650 mg Oral Q8H    lidocaine 4 % patch 2 Patch  2 Patch TransDERmal Q24H    sodium chloride (NS) flush 5-40 mL  5-40 mL IntraVENous Q8H    sodium chloride (NS) flush 5-40 mL  5-40 mL IntraVENous PRN    acetaminophen (TYLENOL) tablet 650 mg  650 mg Oral Q4H PRN    morphine injection 1 mg  1 mg IntraVENous Q4H PRN    heparin (porcine) injection 5,000 Units  5,000 Units SubCUTAneous Q8H    hydrALAZINE (APRESOLINE) 20 mg/mL injection 10 mg  10 mg IntraVENous Q6H PRN     ______________________________________________________________________  EXPECTED LENGTH OF STAY: 3d 12h  ACTUAL LENGTH OF STAY:          Mart Quinteros MD

## 2022-02-02 NOTE — PROGRESS NOTES
Problem: Mobility Impaired (Adult and Pediatric)  Goal: *Acute Goals and Plan of Care (Insert Text)  Description: FUNCTIONAL STATUS PRIOR TO ADMISSION: Patient is a poor historian. Reports independence with mobility. HOME SUPPORT PRIOR TO ADMISSION: The patient lived alone with a local daughter who checked in regularly. Patient typically independent for ADLs. Physical Therapy Goals  Initiated 1/27/2022  1. Patient will move from supine to sit and sit to supine  in bed with moderate assistance  within 7 day(s). 2.  Patient will transfer from bed to chair and chair to bed with moderate assistance  using the least restrictive device within 7 day(s). 3.  Patient will perform sit to stand with moderate assistance  within 7 day(s). 4.  Patient will ambulate with moderate assistance  for 50 feet with the least restrictive device within 7 day(s). Outcome: Progressing Towards Goal       PHYSICAL THERAPY TREATMENT  Patient: Marian Kumar (89 y.o. female)  Date: 2/2/2022  Diagnosis: [facility] [I50.9]  Back pain [M54.9] Back pain       Precautions: Fall  Chart, physical therapy assessment, plan of care and goals were reviewed. ASSESSMENT  Patient continues with skilled PT services and is progressing towards goals. Pt agreeable to mobilize. Pt was able to ambulate to the bathroom. Pt was able to assist with self care. Pt reports no pain in back at this time. pt is tolerating OOB in chair     Current Level of Function Impacting Discharge (mobility/balance): min A     Other factors to consider for discharge: back, decrease mobility and independence, lives alone          PLAN :  Patient continues to benefit from skilled intervention to address the above impairments. Continue treatment per established plan of care. to address goals.     Recommendation for discharge: (in order for the patient to meet his/her long term goals)  Therapy up to 5 days/week in SNF setting    This discharge recommendation:  Has not yet been discussed the attending provider and/or case management    IF patient discharges home will need the following DME: to be determined (TBD)       SUBJECTIVE:   Patient stated Can I use the bathroom .     OBJECTIVE DATA SUMMARY:   Critical Behavior:  Neurologic State: Alert  Orientation Level: Oriented to person,Oriented to place,Oriented to situation  Cognition: Decreased attention/concentration     Functional Mobility Training:  Bed Mobility:     Supine to Sit: Minimum assistance              Transfers:  Sit to Stand: Contact guard assistance  Stand to Sit: Contact guard assistance        Bed to Chair: Contact guard assistance                    Balance:  Sitting: Intact; Without support  Sitting - Static: Fair (occasional)  Sitting - Dynamic: Poor (constant support)  Standing: Impaired  Standing - Static: Fair  Standing - Dynamic : Fair  Ambulation/Gait Training:  Distance (ft): 15 Feet (ft) (to bathroom and back)  Assistive Device: Gait belt;Walker, rolling  Ambulation - Level of Assistance: Contact guard assistance        Gait Abnormalities: Decreased step clearance                 Step Length: Left shortened;Right shortened          Stairs: Therapeutic Exercises:     Pain Rating:  No complaints     Activity Tolerance:   Limited     After treatment patient left in no apparent distress:   Sitting in chair, Call bell within reach, and Bed / chair alarm activated    COMMUNICATION/COLLABORATION:   The patients plan of care was discussed with: Registered nurse.      Adelaide Irving PTA   Time Calculation: 25 mins

## 2022-02-02 NOTE — PROGRESS NOTES
Problem: Self Care Deficits Care Plan (Adult)  Goal: *Acute Goals and Plan of Care (Insert Text)  Description: FUNCTIONAL STATUS PRIOR TO ADMISSION: Pt lives alone. She and her  lives in separate households. Spouse is in apartment, and has multiple myeloma- GEORGE? She performs light meal prep with a lot of delivered food items or microwavable meals. She does not use AD and performs her own ADLs. Pt sits in tub to bathe. HOME SUPPORT: lived alone, family and friend nearby for assistance     Occupational Therapy Goals  Re-evaluation completed after T7,T9 kyphoplasty 1/31/2022  1. Patient will perform grooming in stand with modified independence within 7 day(s). 2.  Patient will perform bathing with modified independence within 7 day(s). 3.  Patient will perform lower body dressing with modified independence within 7 day(s). 4.  Patient will perform toilet transfers with modified independence within 7 day(s). 5.  Patient will perform all aspects of toileting with modified independence within 7 day(s). Initiated 1/28/2022  1. Patient will perform transfer to [facility] with RW with moderate assistance within 7 day(s). 2.  Patient will perform lower body dressing with AE and moderate assistance  within 7 day(s). 3.  Patient will perform bathing in supported sitting with moderate assistance  within 7 day(s). 4.  Patient will perform all aspects of toileting with moderate assistance  within 7 day(s). Outcome: Progressing Towards Goal   OCCUPATIONAL THERAPY RE-EVALUATION  Patient: Brett Hairston (57 y.o. female)  Date: 2/2/2022  Diagnosis: [facility] [I50.9]  Back pain [M54.9] Back pain       Precautions: Fall  Chart, occupational therapy assessment, plan of care, and goals were reviewed. ASSESSMENT  Based on the objective data described below, patient demonstrates impairing ADL performance as pain is now controlled after kyphoplasty (T7,T9).  Patient is limited by impaired standing tolerance, impaired standing balance, decreased insight into condition. Patient lives alone and will benefit from SNF level rehab services to maximize ADL/IADL independence and mobility prior to return home to reduce risk of falls and readmission. Current Level of Function Impacting Discharge (ADLs): minimal assistance for bathing, dressing, toileting    Other factors to consider for discharge: patient lives alone         PLAN :  Recommendations and Planned Interventions: self care training, functional mobility training, therapeutic exercise, balance training, therapeutic activities, endurance activities, patient education, home safety training and family training/education    Frequency/Duration: Patient will be followed by occupational therapy 4 times a week to address goals. Recommend with staff: ADL in bathroom     Recommend next OT session: standing ADL, LB ADL     Recommendation for discharge: (in order for the patient to meet his/her long term goals)  Therapy up to 5 days/week in SNF setting    This discharge recommendation:  Has been made in collaboration with the attending provider and/or case management    Equipment recommendations for successful discharge (if) home: RW? SUBJECTIVE:   Patient     OBJECTIVE DATA SUMMARY:   Hospital course since last seen and reason for reevaluation:  T7,T9 kyphoplasty 1/31/2022    Cognitive/Behavioral Status:  Neurologic State: Alert  Orientation Level: Oriented to person;Oriented to place;Oriented to situation  Cognition: Decreased attention/concentration        Hearing:   Auditory  Auditory Impairment: None    Vision/Perceptual:                 Range of Motion:  WFL        Strength:  WFL           Coordination:        Intact        Tone & Sensation:  Intact            Functional Mobility and Transfers for ADLs:  Bed Mobility:  Supine to Sit: Minimum assistance    Transfers:  Sit to Stand: Contact guard assistance  Functional Transfers  Bathroom Mobility: Dependent/total assistance  Bed to Chair: Contact guard assistance    Balance:  Sitting: Intact; Without support  Sitting - Static: Fair (occasional)  Sitting - Dynamic: Poor (constant support)  Standing: Impaired  Standing - Static: Fair  Standing - Dynamic : Fair    ADL Assessment:  Feeding: Independent    Oral Facial Hygiene/Grooming: Setup    Bathing: Minimum assistance    Upper Body Dressing: Setup    Lower Body Dressing: Minimum assistance    Toileting: Minimum assistance                ADL Intervention and task modifications:     Educated on back protection techniques including                Functional Measure:    Barthel Index:  Bathin  Bladder: 5  Bowels: 10  Groomin  Dressin  Feedin  Mobility: 0  Stairs: 5  Toilet Use: 5  Transfer (Bed to Chair and Back): 10  Total: 50/100      The Barthel ADL Index: Guidelines  1. The index should be used as a record of what a patient does, not as a record of what a patient could do. 2. The main aim is to establish degree of independence from any help, physical or verbal, however minor and for whatever reason. 3. The need for supervision renders the patient not independent. 4. A patient's performance should be established using the best available evidence. Asking the patient, friends/relatives and nurses are the usual sources, but direct observation and common sense are also important. However direct testing is not needed. 5. Usually the patient's performance over the preceding 24-48 hours is important, but occasionally longer periods will be relevant. 6. Middle categories imply that the patient supplies over 50 per cent of the effort. 7. Use of aids to be independent is allowed. Score Interpretation (from [address] [address])    Independent   60-79 Minimally independent   40-59 Partially dependent   20-39 Very dependent   <20 Totally dependent     -Richard Gorman., Barthel, D.W. (1965). Functional evaluation: the Barthel Index.  Jay COTA [address] J 142.  -MARICARMEN Davis, [address] (1997). The Barthel activities of daily living index: self-reporting versus actual performance in the old (> or = 75 years). [place] [address](7), 14 [facility], MorenitaEVA, Syeda Ceron, Luis Amaya. (1999). Measuring the change in disability after inpatient rehabilitation; comparison of the responsiveness of the Barthel Index and Functional [place] Measure. Journal of Neurology, Neurosurgery, and Psychiatry, 66(4), 314-189. ELKE Iraheta, DAKOTA Elaine, & Corrina Benavides, MMorenitaA. (2004) Assessment of post-stroke quality of life in cost-effectiveness studies: The usefulness of the Barthel Index and the EuroQoL-5D. Quality of Life Research, 13, 427-43         Pain:  None     Activity Tolerance:   Fair    After treatment patient left in no apparent distress:   Sitting in chair    COMMUNICATION/COLLABORATION:   The patients plan of care was discussed with: Physical therapist and Registered nurse.      Cedric Mosher OT  Time Calculation: 44 mins

## 2022-02-03 PROCEDURE — 74011250636 HC RX REV CODE- 250/636: Performed by: FAMILY MEDICINE

## 2022-02-03 PROCEDURE — 97535 SELF CARE MNGMENT TRAINING: CPT

## 2022-02-03 PROCEDURE — 97116 GAIT TRAINING THERAPY: CPT

## 2022-02-03 PROCEDURE — 94761 N-INVAS EAR/PLS OXIMETRY MLT: CPT

## 2022-02-03 PROCEDURE — 65660000000 HC RM CCU STEPDOWN

## 2022-02-03 RX ADMIN — HEPARIN SODIUM 5000 UNITS: 5000 INJECTION INTRAVENOUS; SUBCUTANEOUS at 20:00

## 2022-02-03 NOTE — PROGRESS NOTES
Problem: Self Care Deficits Care Plan (Adult)  Goal: *Acute Goals and Plan of Care (Insert Text)  Description: FUNCTIONAL STATUS PRIOR TO ADMISSION: Pt lives alone. She and her  lives in separate households. Spouse is in apartment, and has multiple myeloma- GEORGE? She performs light meal prep with a lot of delivered food items or microwavable meals. She does not use AD and performs her own ADLs. Pt sits in tub to bathe. HOME SUPPORT: lived alone, family and friend nearby for assistance     Occupational Therapy Goals  Re-evaluation completed after T7,T9 kyphoplasty 1/31/2022  1. Patient will perform grooming in stand with modified independence within 7 day(s). 2.  Patient will perform bathing with modified independence within 7 day(s). 3.  Patient will perform lower body dressing with modified independence within 7 day(s). 4.  Patient will perform toilet transfers with modified independence within 7 day(s). 5.  Patient will perform all aspects of toileting with modified independence within 7 day(s). Initiated 1/28/2022  1. Patient will perform transfer to [facility] with RW with moderate assistance within 7 day(s). 2.  Patient will perform lower body dressing with AE and moderate assistance  within 7 day(s). 3.  Patient will perform bathing in supported sitting with moderate assistance  within 7 day(s). 4.  Patient will perform all aspects of toileting with moderate assistance  within 7 day(s). Outcome: Not Met   OCCUPATIONAL THERAPY TREATMENT  Patient: Gust Collet (23 y.o. female)  Date: 2/3/2022  Diagnosis: [facility] [I50.9]  Back pain [M54.9] Back pain       Precautions: Fall  Chart, occupational therapy assessment, plan of care, and goals were reviewed. ASSESSMENT  Patient continues with skilled OT services and is progressing slowly towards goals.   Today's session impacted by patient's  agitation at OT attempts at instructing patient in safe bathroom mobility/toileting. Patient impulsive and somewhat frantic at attempts of getting out of chair (though telemetry line attached) and into bathroom to avoid bowel accident. Patient began yelling and wound not follow any commands for safe RW use, or mobility techniques. ADL re-training aborted, handoff to nurse for further assistance in bathroom due to patient's agitation. Current Level of Function Impacting Discharge (ADLs): min to mod assist with bathing, dressing, toileting     Other factors to consider for discharge: patient lives alone          PLAN :  Patient continues to benefit from skilled intervention to address the above impairments. Continue treatment per established plan of care to address goals. Recommend with staff: encourage ADL participation     Recommend next OT session: progress POC as able     Recommendation for discharge: (in order for the patient to meet his/her long term goals)  Therapy up to 5 days/week in SNF setting    This discharge recommendation:  Has been made in collaboration with the attending provider and/or case management    IF patient discharges home will need the following DME: ?RW? SUBJECTIVE:   Patient received attempting to get out of chair for toileting     OBJECTIVE DATA SUMMARY:   Cognitive/Behavioral Status:  Neurologic State: Alert  Orientation Level: Oriented to person  Cognition: Decreased command following;Decreased attention/concentration; Impaired decision making;Poor safety awareness; Impulsive        Safety/Judgement: Decreased awareness of need for assistance;Decreased awareness of need for safety;Decreased insight into deficits    Functional Mobility and Transfers for ADLs:  Bed Mobility:  Supine to Sit:  (received in chair)    Transfers:  Sit to Stand: Supervision  Functional Transfers  Bathroom Mobility: Minimum assistance  Cues: Physical assistance; Tactile cues provided;Verbal cues provided  Adaptive Equipment: Walker (comment) Balance:  Sitting: Intact  Standing: Impaired; With support  Standing - Static: Fair;Constant support  Standing - Dynamic : Poor;Constant support    ADL Intervention:               Toileting  Toileting Assistance: Minimum assistance    Cognitive Retraining  Safety/Judgement: Decreased awareness of need for assistance;Decreased awareness of need for safety;Decreased insight into deficits    Pain:  None indicated     Activity Tolerance:   Fair    After treatment patient left in no apparent distress:   Hand off to nurse    COMMUNICATION/COLLABORATION:   The patients plan of care was discussed with: Registered nurse.      Radha Torres OT  Time Calculation: 8 mins

## 2022-02-03 NOTE — PROGRESS NOTES
Patient repeatedly standing up from recliner chair and setting chair alarm off. Both RN and tech have been into patients room to redirect her and have her sit back down for her safety with patient refusing and yelling. Patient states she wants to get dressed and can do so \"because she is an adult\". Patient ripped monitor leads off and currently putting clothing from home on in her room. This RN educated patient that she should be using walker when out of bed with staff assistance. Patient refuses to listen to staff.

## 2022-02-03 NOTE — ADT AUTH CERT NOTES
Review Status Review Entered   In Primary 2/2/2022 14:48      Criteria Review   Additional Clinical for Reconsideration Request  Admission IP 1/24/22  Status change to OBS 1/31/22     **  Patient remains in-house, and status changed back to INPT 2/1/22  Please review add't clinical for INPT reconsideration         ------------------------------------  **--  Cont Stay Review 1/27:     Vitals:  T 98.4  HR 80   RR 23   137/99   92% Room Air  Pain Score: 10 (Back)     Meds: acetaminophen 650mg PO x1, Lidocaine patch TD, oxycodone 5mg PO x1     IP CONSULT TO NEUROLOGY   IP CONSULT TO OCCUPATIONAL THERAPY      Notes:   --Pt increasingly agitated & verbally aggressive when I went in with PCT to get vital signs & readjust her. Pt refusing to get pulled up in bed, stating this RN is \"hateful & needs a new line of work. \" PCT able to get a BP, but not a temperature at this time.     --Pt once again verbally aggressive & agitated, refusing vital signs & subq heparin     --Pt screaming out that she's in pain. Went to assess pt. Pt stated her pain is a 10/10 and then stated \"This is a hospital I need a doctor not a nurse like you. \" PRN morphine was given.     --Attempted incontinence care on patient. While the PCT was providing care the patient began to get physically and verbally aggressive. Patient kicked, hit, and screamed. Also patient called PCT inappropriate names while getting care.         MRI: Acute T7 and T9 vertebral compression fractures are shown appearing moderate T7  and moderate to severe at T9. The degree of osseous signal alteration is greater  in the T9 vertebral body. There is up to 70% loss of vertebral body height at T7  and 90% loss of vertebral body height at T9  A severe kyphosis is demonstrated which is centered at T8, estimated at 92  degrees.  There is mild retropositioning of several vertebral bodies,  predominantly the T11 and T12 vertebral bodies              -------------------------------------  **--  Cont Stay Review  1/28:     Vitals: T 98   81    23    162/99    93% RA  Pain Score: 7 (Back)     Labs: WBC 11.7 (H), Na 129 (L), Glucose 121 (H)     Meds: acetaminophen 650mg PO x1, Haldol 1mg PO x1, oxycodone 2.5mg PO x1     IP CONSULT TO INTERVENTIONAL RADIOLOGY   IR KYPHOPLASTY THORACIC      ---PT assessment: Patient remains limited by confusion, poor initiation and sequencing of activity, poor balance, and posture. The pt had brief periods of unresponsiveness, especially when laid fully supine. Patient would respond with continued stimulation but after a 5+ second pause. Transferred to sitting EOB required 2 attempts before successful d/t reported pain and patient actively resisting when attempting to log roll for sitting. She was then able to sit EOB with severely flexed posture resting on her elbows in midline. Posture briefly improved when BUE were placed on a RW but she was unable to maintain and quickly fatigued. Unsafe to progress beyond EOB and returned in right sidelying for pressure relief post session.     --OT Eval: The patient presents with acute thoracic back pain. Pt actively resistive of efforts for log rolling and required two attempts to successfully achieve sitting with max A x 2. Pt demonstrated significantly flexed posture while sitting EOB with head close to knees and elbows resting on her thighs. Pt demonstrated two episodes of brief postural extension before fatiguing back into a flexed posture        Assessment & Plan:  Confused and agitated overnight  Having worsening of back pain today  Musculoskeletal/Back:     ++tenderness midspinal in lower thoracic area      Acute T7 and T9 compression fx with pain  Upper and lower back pain  -Evaluated by neurosurgery. No indication for acute neurosurgery at this time.   - worsening of pain 1/28, MRI T spine reviewed  - d/w Neurosurgery , recommends IR consult for Kyphoplasty  - IR consulted   -PT OT to evaluate patient  - pain control with Tylenol and oxycodone 2.5mg q8h     Possible moderately advanced Dementia  - Neuro eval noted, likely has underlying moderate dementia, needs formal eval in outpatient setting.   - not safe for discharge home, PT recommends SNF, will get OT eval              ---------------------------------------------  **--  Cont Stay Review 1/29:     Vitals: T 98.7  93    22    154/82    97% RA  Pain Score: 5     Meds: acetaminophen 650mg PO x2, Haldol 1mg PO x1, tramadol 50mg PO x2, oxycodone 2.5mg PO x1     Assessment & Plan:  Sleeping       Acute T7 and T9 compression fx with pain  Upper and lower back pain  -Evaluated by neurosurgery. No indication for acute neurosurgery at this time. - worsening of pain 1/28, MRI T spine reviewed  - d/w Neurosurgery , recommends IR consult for Kyphoplasty  - IR consulted   -PT OT to evaluate patient  - pain control with Tylenol  - d/c Oxy, start Tramadol BID for excessive drowsiness  - bowel regimen                 -------------------------------------------------  **--   Cont Stay Review 1/31:     Vitals: T 98    HR: 58, 57, 51   15   172/94    98% 3L nasal cannula  Pain Score:  4     Labs: Na 131 (L), Ch 96 (L), Glucose 107 (H)     Meds: acetaminophen 650mg PO x3, Lidocaine patch TD, tramadol 50mg PO x1, Ancef 2g IV x1, Toradol 15mg IV x1     DIET NPO   BEDREST, COMPLETE   NEUROLOGIC STATUS ASSESSMENT      -- Interventional and Vascular Radiology Consult:  80year old female with acute traumatic T7/T9 vertebral body compression fractures presents for kyphoplasty. Planned Procedure:  Risks, benefits, and alternatives reviewed with patient and she agrees to proceed with the procedure.  Conscious sedation will be performed with IV fentanyl and versed        IR KYPHOPLASTY THORACIC:  Clinical Data: 14-year-old female with osteoporosis and acute traumatic T7 and  T9 vertebral body compression fractures presents for kyphoplasty.     Procedure:  1. Fluoroscopy guided placement of cannulas into the bilateral T7 and T9  pedicles. 2. Balloon kyphoplasty of the T7 and T9 vertebral bodies with cement  augmentation. 3. IV conscious sedation.     Procedure:  1. Fluoroscopy guided placement of cannulas into the bilateral T7 and T9  pedicles. 2. Balloon kyphoplasty of the T7 and T9 vertebral bodies with cement  augmentation. 3. IV conscious sedation.        ---RN Note: Pt attempting to get out of bed, tried to reorient patient but she was verbally aggressive. She took out her own IV as well, stating \"I don't need this anymore\"                 ---------------------------------  02/01/22 1300   INITIAL PHYSICIAN ORDER: INPATIENT Telemetry; Yes; 3. Patient receiving treatment that can only be provided in an inpatient setting (further clarification in H&P documentation)  (ADMISSION ORDERS)  ONE TIME        References:    CLICK HERE-** FAQ-NEW CMS RULES FOR INPATIENT CERTIFICATION **   Question Answer     Status: INPATIENT     Type of Bed Telemetry     Cardiac Monitoring Required? Yes     Inpatient Hospitalization Certified Necessary for the Following Reasons 3.  Patient receiving treatment that can only be provided in an inpatient setting (further clarification in H&P documentation)     Admitting Diagnosis Back pain     Admitting Physician Evette Isaac     Attending Physician DION NO     Estimated Length of Stay 2 Midnights     Discharge Plan: Extended [facility] (e.g. Adult Home, Nursing Home, etc.)                 -----------------------------------------------------------------------------  Cont Stay review 2/1:     Vitals: T 97.5    81    23    142/80     90% RA     Meds: acetaminophen 650mg PO x2, Haldol 1mg PO x1, Ativan 0.5mg IM x1,  Haldol 2mg IM x1     RESTRAINTS (NON-VIOLENT) Continuous Until 2359 of Next Calendar Day     -- Code atlas called on patient after she was repeatedly attempting to get out of bed, despite efforts to reorient her. Pt once again became extremely verbally & physically aggressive, screaming & swinging at staff. NP placed orders for IM haldol & ativan. Patient placed in soft bilateral wrist restraints     Assessment/ Plan:  F/u compression fractures  Overnight reportedly the patient was confused and agitated when was given Ativan/Haldol     Assessment & Plan:     Acute T7 and T9 compression fx with pain  Upper and lower back pain  -Evaluated by neurosurgery. No indication for acute neurosurgery at this time. - worsening of pain 1/28, MRI T spine reviewed  - d/w Neurosurgery , recommends Kyphoplasty  - s/p kypho  -PT OT to evaluate patient  - pain control with Tylenol  - d/c Oxy, start Tramadol BID for excessive drowsiness  - bowel regimen     Possible moderately advanced Dementia  - Neuro eval noted, likely has underlying moderate dementia, needs formal eval in outpatient setting.   - not safe for discharge home, PT recommends SNF. Awaiting re-eval after kypho        ---OT eval:   Pt is post op day 1 from T7 and T9 kyphoplasty. Noted pt with code atlas early this AM and currently in B wrist restraints. Pt received IM Haldol and Ativan. Pt drowsy at this time.  Will follow for OT once pt is more awake and cooperative              --------------------------------  Progress Note 2/2:  F/u compression fractures  Overnight reportedly the patient was confused and agitated when was given Ativan/Haldol  Awaiting re-eval after kypho

## 2022-02-03 NOTE — PROGRESS NOTES
Pt increasingly agitated & yelling at PCT while she was attempting to get her blood pressure reading. Pt's /113 as she would not stay till & would not allow PCT to retake & demanded she leave her alone. Will pass on to day shift RN.

## 2022-02-03 NOTE — PROGRESS NOTES
Bedside and Verbal shift change report given to Gaby Ibarra RN (oncoming nurse) by Zeenat Cerna RN (offgoing nurse). Report included the following information SBAR, Kardex, ED Summary, Procedure Summary, Intake/Output, MAR, Recent Results and Cardiac Rhythm A fib.

## 2022-02-03 NOTE — PROGRESS NOTES
Transition of Care  1. RUR 12% Low  2. Disposition SNF rehab-pending auth, acceptance and bed availability   3. DME None  4. Transportation BLS  5. Follow Up PCP, Specialist    CM received call from patient's . CM provided updates. [facility], [facility], [facility] have declined to accept patient.  provided additional choice for [facility] but is also agreeable to CM sending out additional referrals to area SNFs. Patient is medically stable for DC. CM to initiate auth today. CM to monitor. Rogena Meigs, MS     3:02 CM initiated auth with [employer] [phone]. Ref# [id number]. CM to fax supporting medicals to [phone]. Rogena Meigs, MS    3:12 [facility] has accepted patient, requesting rapid COVID test. CM notified daughter Mason Negron and  Maura Isaac. Family wants to research the facility. CM to monitor.      Rogena Meigs, MS

## 2022-02-03 NOTE — PROGRESS NOTES
Problem: Mobility Impaired (Adult and Pediatric)  Goal: *Acute Goals and Plan of Care (Insert Text)  Description: FUNCTIONAL STATUS PRIOR TO ADMISSION: Patient is a poor historian. Reports independence with mobility. HOME SUPPORT PRIOR TO ADMISSION: The patient lived alone with a local daughter who checked in regularly. Patient typically independent for ADLs. Physical Therapy Goals  Goals re-assessed 2/3/2022   1. Patient will move from supine to sit and sit to supine in bed with minimal assistance within 7 day(s). 2.  Patient will transfer from bed to chair and chair to bed with CGA using the least restrictive device within 7 day(s). 3.  Patient will perform sit to stand with CGA within 7 day(s). 4.  Patient will ambulate with CGA for 50 feet with the least restrictive device within 7 day(s). Initiated 1/27/2022  1. Patient will move from supine to sit and sit to supine  in bed with moderate assistance  within 7 day(s). 2.  Patient will transfer from bed to chair and chair to bed with moderate assistance  using the least restrictive device within 7 day(s). 3.  Patient will perform sit to stand with moderate assistance  within 7 day(s). 4.  Patient will ambulate with moderate assistance  for 50 feet with the least restrictive device within 7 day(s). Outcome: Progressing Towards Goal  PHYSICAL THERAPY TREATMENT: WEEKLY REASSESSMENT  Patient: Young King (40 y.o. female)  Date: 2/3/2022  Primary Diagnosis: CHF (congestive heart failure) ([facility] [I50.9]  Back pain [M54.9]        Precautions: Fall      ASSESSMENT  Patient continues with skilled PT services and is progressing towards goals - presents with confusion, intermittent agitation (possible underlying dementia), impaired balance, unsteady gait, path deviation, and overall decline in functional mobility. Pt perseverative on many grievances about hospital stay but ultimately redirectable and agreeable to participate with therapy. Transferred sit<>stand with CGA and ambulated with and without RW. Pt required Phill and demonstrated poor walker proximity and management leading to forward flexed posture and poor obstacle avoidance. When walker was removed, pt continued to require Phill but demonstrated improved steadiness and obstacle avoidance. Ended session in chair with chair alarm set, all needs met, and nursing updated. Recommending SNF upon discharge. Current Level of Function Impacting Discharge (mobility/balance): Phill for ambulation     Other factors to consider for discharge: Fall risk, lives alone, confusion (underlying dementia?)         PLAN :  Goals have been updated based on progression since last assessment. Patient continues to benefit from skilled intervention to address the above impairments. Recommendations and Planned Interventions: bed mobility training, transfer training, gait training, therapeutic exercises, neuromuscular re-education, patient and family training/education, and therapeutic activities      Frequency/Duration: Patient will be followed by physical therapy:  3 times a week to address goals. Recommendation for discharge: (in order for the patient to meet his/her long term goals)  Therapy up to 5 days/week in SNF setting    This discharge recommendation:  Has not yet been discussed the attending provider and/or case management    IF patient discharges home will need the following DME: none         SUBJECTIVE:   Patient stated I don't want to sound like Silvina, but this has much to do about nothing.     OBJECTIVE DATA SUMMARY:   HISTORY:    Past Medical History:   Diagnosis Date    Atrial fibrillation, chronic (HCC)     Back pain     Sciatica     Spondylolysis      Past Surgical History:   Procedure Laterality Date    HX APPENDECTOMY      HX TONSIL AND ADENOIDECTOMY      IR KYPHOPLASTY THORACIC  1/31/2022     Home Situation  Home Environment: Private residence  # Steps to Enter: 4  One/Two Story Residence:  (3 story and alab's bedroom on 2nd)  Living Alone: Yes  Support Systems: Child(catherine),Friend/Neighbor  Patient Expects to be Discharged to[de-identified]  (TBD)  Current DME Used/Available at Home: Walker, rolling  Tub or Shower Type: Tub/Shower combination    EXAMINATION/PRESENTATION/DECISION MAKING:   Critical Behavior:  Neurologic State: Alert  Orientation Level: Disoriented to place,Disoriented to situation,Disoriented to time,Oriented to person  Cognition: Poor safety awareness,Decreased attention/concentration,Decreased command following     Hearing: Auditory  Auditory Impairment: None    Functional Mobility:  Bed Mobility:  Supine to Sit:  (received in chair)    Transfers:  Sit to Stand: Contact guard assistance  Stand to Sit: Contact guard assistance    Balance:   Sitting: Intact  Standing: Impaired; With support  Standing - Static: Fair;Constant support  Standing - Dynamic : Poor;Constant support    Ambulation/Gait Training:  Distance (ft): 40 Feet (ft) (x2 reps with and without RW)  Assistive Device: Gait belt;Walker, rolling (with and without RW; HHA in place of RW)  Ambulation - Level of Assistance: Minimal assistance  Gait Abnormalities: Decreased step clearance;Shuffling gait;Trunk sway increased; Path deviations  Step Length: Right shortened;Left shortened    Activity Tolerance:   Fair    After treatment patient left in no apparent distress:   Sitting in chair, Call bell within reach, and Bed / chair alarm activated    COMMUNICATION/EDUCATION:   The patients plan of care was discussed with: Registered nurse. Fall prevention education was provided and the patient/caregiver indicated understanding., Patient/family have participated as able in goal setting and plan of care. , and Patient/family agree to work toward stated goals and plan of care.     Thank you for this referral.  Carol Zaidi, PT, DPT   Time Calculation: 14 mins

## 2022-02-04 PROCEDURE — 74011000250 HC RX REV CODE- 250: Performed by: HOSPITALIST

## 2022-02-04 PROCEDURE — 97116 GAIT TRAINING THERAPY: CPT

## 2022-02-04 PROCEDURE — 65660000000 HC RM CCU STEPDOWN

## 2022-02-04 PROCEDURE — 74011250637 HC RX REV CODE- 250/637: Performed by: HOSPITALIST

## 2022-02-04 PROCEDURE — 94761 N-INVAS EAR/PLS OXIMETRY MLT: CPT

## 2022-02-04 RX ADMIN — TRAMADOL HYDROCHLORIDE 50 MG: 50 TABLET, COATED ORAL at 08:59

## 2022-02-04 RX ADMIN — TRAMADOL HYDROCHLORIDE 50 MG: 50 TABLET, COATED ORAL at 17:40

## 2022-02-04 RX ADMIN — ACETAMINOPHEN 650 MG: 325 TABLET ORAL at 22:02

## 2022-02-04 NOTE — PROGRESS NOTES
Occupational Therapy  2/4/2022    Chart reviewed. Read PT note in which pt was agitated and not receptive to any safety education or cueing during mobility efforts. Noted similar behavior with OT in yesterday's session. Pt is refusing SNF rehab per chart. Due to pt not being receptive for therapy, will defer OT at this time. If pt is insistent on going home, recommend HHOT/PT for home safety recommendations to family.  [place]ana Ryan, OT

## 2022-02-04 NOTE — PROGRESS NOTES
1300: Patient has plan to discharge rehab. Covid rapid test order received. Patient refusing for Rehab placement and Covid Test swab.

## 2022-02-04 NOTE — PROGRESS NOTES
Problem: Mobility Impaired (Adult and Pediatric)  Goal: *Acute Goals and Plan of Care (Insert Text)  Description: FUNCTIONAL STATUS PRIOR TO ADMISSION: Patient is a poor historian. Reports independence with mobility. HOME SUPPORT PRIOR TO ADMISSION: The patient lived alone with a local daughter who checked in regularly. Patient typically independent for ADLs. Physical Therapy Goals  Goals re-assessed 2/3/2022   1. Patient will move from supine to sit and sit to supine in bed with minimal assistance within 7 day(s). 2.  Patient will transfer from bed to chair and chair to bed with CGA using the least restrictive device within 7 day(s). 3.  Patient will perform sit to stand with CGA within 7 day(s). 4.  Patient will ambulate with CGA for 50 feet with the least restrictive device within 7 day(s). Initiated 1/27/2022  1. Patient will move from supine to sit and sit to supine  in bed with moderate assistance  within 7 day(s). 2.  Patient will transfer from bed to chair and chair to bed with moderate assistance  using the least restrictive device within 7 day(s). 3.  Patient will perform sit to stand with moderate assistance  within 7 day(s). 4.  Patient will ambulate with moderate assistance  for 50 feet with the least restrictive device within 7 day(s). Outcome: Progressing Towards Goal   PHYSICAL THERAPY TREATMENT  Patient: Yoli Cheung (59 y.o. female)  Date: 2/4/2022  Diagnosis: [facility] [I50.9]  Back pain [M54.9] Back pain       Precautions: Fall  Chart, physical therapy assessment, plan of care and goals were reviewed. ASSESSMENT  Patient continues with skilled PT services and is progressing towards goals. Was asked to see pt to possibly clear her for discharge to home (had refused rehab). She amb a lap around the unit using a rolling walker for support and refused the stairs. Her gait was accelerated and note path deviations.  When I offered recommends like to slow down she became agitated and yelled at me. Her gait was stable with the walker for support. She refused to wear a gait belt, was not receptive to education and refused to answer orientation questions (see subjective below). Given above, I recommend HHPT        Current Level of Function Impacting Discharge (mobility/balance): provided supervision level. Other factors to consider for discharge: lives alone and per chart review, dementia at baseline. PLAN :  Patient continues to benefit from skilled intervention to address the above impairments. Continue treatment per established plan of care. to address goals. Recommendation for discharge: (in order for the patient to meet his/her long term goals)  Physical therapy at least 2 days/week in the home     This discharge recommendation:  Has been made in collaboration with the attending provider and/or case management    IF patient discharges home will need the following DME: rolling walker       SUBJECTIVE:   Patient stated Don't come in her asking me the date when you know the date, that's demeaning.     OBJECTIVE DATA SUMMARY:   Critical Behavior:  Neurologic State: Alert,Confused  Orientation Level: Oriented to person (refused to answer questions of date and location, and partially oriented to situation)  Cognition: Impaired decision making,Poor safety awareness  Safety/Judgement: Decreased awareness of need for assistance,Decreased awareness of need for safety,Decreased insight into deficits  Functional Mobility Training:  Bed Mobility:         Not assessed           Transfers:  Sit to Stand: Supervision  Stand to Sit: Supervision                             Balance:  Sitting: Intact; Without support  Standing: Impaired  Standing - Static: Constant support;Good  Standing - Dynamic : Constant support;Good  Ambulation/Gait Training:  Distance (ft): 300 Feet (ft)  Assistive Device: Walker, rolling (refused use of the gait belt) and chastised me for standing next to her for sit <> stand and during amb   Ambulation - Level of Assistance: Supervision        Gait Abnormalities: Decreased step clearance; Path deviations                                      Stairs:  Number of Stairs Trained:  (refused)           Therapeutic Exercises:     Pain Rating:  None rated    Activity Tolerance:   Good    After treatment patient left in no apparent distress:   Sitting in chair and Call bell within reach    COMMUNICATION/COLLABORATION:   The patients plan of care was discussed with: Registered nurse and Case management.      Tonie Cunningham   Time Calculation: 10 mins

## 2022-02-04 NOTE — PROGRESS NOTES
Transition of Care  1. RUR 11% Low  2. Disposition TBD, patient refusing SNF rehab  3. DME None  4. Transportation BLS  5. Follow Up PCP, Specialist      CM received phone call from  Yuri Anderson with two additional SNF choices: [facility] and [facility] referrals placed via [facility] [place]. Auth received and accepting facilities are [facility], [facility], and [facility]. Patient's family are not pleased with ratings for [facility] and would like to research the other two accepting facilities. CM to monitor. Eusebio Lobatoancelmocosta, MS     1:19 CM and nurse met with patient at bedside. Patient refusing COVID swab. She is also refusing to DC to SNF rehab. Family now has preference for [facility] [employer]. CM updated patient's daughter Noam Ramsey. Family concerned that the patient will want to DC back to her home where she has been alone. CM did inform Noam Ramsey that home health therapy could be arranged if patient in agreement. CM placed call to Maddie rGaff at [place] [address]. CM to monitor. Eusebio Beaver, MS    3:25 Although patient is refusing SNF rehab at thist time, auth received for patient for [facility] [place] ID [id number]. Patient approved for four days with start date of 2/522 and next review date of 2/8/22. Assigned care coordinator is Hero Clemons. Fax updates to [phone]. CM to monitor.      Eusebio Beaver, MS

## 2022-02-05 LAB
ANION GAP SERPL CALC-SCNC: 3 MMOL/L (ref 5–15)
BASOPHILS # BLD: 0.1 K/UL (ref 0–0.1)
BASOPHILS NFR BLD: 1 % (ref 0–1)
BUN SERPL-MCNC: 15 MG/DL (ref 6–20)
BUN/CREAT SERPL: 27 (ref 12–20)
CALCIUM SERPL-MCNC: 9.9 MG/DL (ref 8.5–10.1)
CHLORIDE SERPL-SCNC: 97 MMOL/L (ref 97–108)
CO2 SERPL-SCNC: 31 MMOL/L (ref 21–32)
COVID-19 RAPID TEST, COVR: NOT DETECTED
CREAT SERPL-MCNC: 0.56 MG/DL (ref 0.55–1.02)
DIFFERENTIAL METHOD BLD: ABNORMAL
EOSINOPHIL # BLD: 0.1 K/UL (ref 0–0.4)
EOSINOPHIL NFR BLD: 2 % (ref 0–7)
ERYTHROCYTE [DISTWIDTH] IN BLOOD BY AUTOMATED COUNT: 11.8 % (ref 11.5–14.5)
GLUCOSE SERPL-MCNC: 103 MG/DL (ref 65–100)
HCT VFR BLD AUTO: 45 % (ref 35–47)
HGB BLD-MCNC: 14.8 G/DL (ref 11.5–16)
IMM GRANULOCYTES # BLD AUTO: 0 K/UL (ref 0–0.04)
IMM GRANULOCYTES NFR BLD AUTO: 1 % (ref 0–0.5)
LYMPHOCYTES # BLD: 0.9 K/UL (ref 0.8–3.5)
LYMPHOCYTES NFR BLD: 14 % (ref 12–49)
MCH RBC QN AUTO: 33.3 PG (ref 26–34)
MCHC RBC AUTO-ENTMCNC: 32.9 G/DL (ref 30–36.5)
MCV RBC AUTO: 101.4 FL (ref 80–99)
MONOCYTES # BLD: 0.4 K/UL (ref 0–1)
MONOCYTES NFR BLD: 6 % (ref 5–13)
NEUTS SEG # BLD: 4.8 K/UL (ref 1.8–8)
NEUTS SEG NFR BLD: 76 % (ref 32–75)
NRBC # BLD: 0 K/UL (ref 0–0.01)
NRBC BLD-RTO: 0 PER 100 WBC
PLATELET # BLD AUTO: 301 K/UL (ref 150–400)
PMV BLD AUTO: 10.6 FL (ref 8.9–12.9)
POTASSIUM SERPL-SCNC: 4.2 MMOL/L (ref 3.5–5.1)
RBC # BLD AUTO: 4.44 M/UL (ref 3.8–5.2)
SODIUM SERPL-SCNC: 131 MMOL/L (ref 136–145)
SOURCE, COVRS: NORMAL
WBC # BLD AUTO: 6.3 K/UL (ref 3.6–11)

## 2022-02-05 PROCEDURE — 74011000250 HC RX REV CODE- 250: Performed by: HOSPITALIST

## 2022-02-05 PROCEDURE — 87635 SARS-COV-2 COVID-19 AMP PRB: CPT

## 2022-02-05 PROCEDURE — 85025 COMPLETE CBC W/AUTO DIFF WBC: CPT

## 2022-02-05 PROCEDURE — 65660000000 HC RM CCU STEPDOWN

## 2022-02-05 PROCEDURE — 36415 COLL VENOUS BLD VENIPUNCTURE: CPT

## 2022-02-05 PROCEDURE — 74011250637 HC RX REV CODE- 250/637: Performed by: HOSPITALIST

## 2022-02-05 PROCEDURE — 94761 N-INVAS EAR/PLS OXIMETRY MLT: CPT

## 2022-02-05 PROCEDURE — 80048 BASIC METABOLIC PNL TOTAL CA: CPT

## 2022-02-05 RX ORDER — FERROUS SULFATE, DRIED 160(50) MG
1 TABLET, EXTENDED RELEASE ORAL
Status: DISCONTINUED | OUTPATIENT
Start: 2022-02-06 | End: 2022-02-06 | Stop reason: HOSPADM

## 2022-02-05 RX ORDER — TRAMADOL HYDROCHLORIDE 50 MG/1
25 TABLET ORAL
Status: DISCONTINUED | OUTPATIENT
Start: 2022-02-05 | End: 2022-02-06 | Stop reason: HOSPADM

## 2022-02-05 NOTE — PROGRESS NOTES
[facility] [facility] Adult  Hospitalist Group                                                                                          Hospitalist Progress Note  Wade Middleton MD  Answering service: [phone] OR [phone] from in house phone        Date of Service:  2022  NAME:  Ciera Hernandes  :  1935  MRN:  [id number]      Admission Summary:   History was primarily obtained from the patient's daughter, patient is alert, is somewhat confused, suspect underlying dementia, patient apparently presented with back pain that started a few days back, patient reports that she is not sure whether she had a fall or not but as far she remembers she has not had any falls or injuries, patient reports that it is hard for her to walk because she continues to have significant pain, came to the ER yesterday, who was found to have multiple thoracic compression fractures, the daughter reports that she has had history of thoracic compression fractures in the past, the daughter also reports that patient is having some shortness of breath on exertion, no fever chills, has had received vaccine focal    Interval history / Subjective:   Was sitting in chair  No new complaints. Waiting for placement     Assessment & Plan:     Acute T7 and T9 compression fx with pain  Upper and lower back pain  - s/p kypho  - pain control with Tylenol  - On Tramadol   - bowel regimen    Mild hyponatremia    Chronic diastolic CHF  -Euvolemic and not acutely decompensated. -Can follow with cardiology outpatient. Possible moderately advanced Dementia  - Neuro eval noted, likely has underlying moderate dementia, needs formal eval in outpatient setting.   - not safe for discharge home, PT recommends SNF.      Code status: full code  DVT prophylaxis: heparin        Care Plan discussed with: Patient/Family  Anticipated Disposition: snf  Anticipated Discharge:snf     Hospital Problems  Date Reviewed: 2022          Codes Class Noted POA    * (Principal) Back pain ICD-10-CM: M54.9  ICD-9-CM: 724.5  1/31/2022 Yes        CHF (congestive heart failure) (HCC) ICD-10-CM: I50.9  ICD-9-CM: 428.0  1/24/2022 Unknown                Review of Systems:   A comprehensive review of systems was negative except for that written in the HPI. Vital Signs:    Last 24hrs VS reviewed since prior progress note. Most recent are:  Visit Vitals  /74   Pulse 60   Temp 97.5 °F (36.4 °C)   Resp 16   Ht 5' 7\" (1.702 m)   Wt 46.8 kg (103 lb 1.6 oz)   SpO2 96%   BMI 16.15 kg/m²         Intake/Output Summary (Last 24 hours) at 2/5/2022 0014  Last data filed at 2/4/2022 1208  Gross per 24 hour   Intake 120 ml   Output --   Net 120 ml        Physical Examination:     I had a face to face encounter with this patient and independently examined them on 2/5/2022 as outlined below:          Constitutional:  No acute distres   Resp:  CTA bilaterally. CV:  Regular rhythm, normal rate, no murmurs, gallops, rubs    GI:  Soft, non distended, non tender. normoactive bowel sounds,    Musculoskeletal/  Back:  No edema, warm, 2+ pulses throughout   tenderness lower back     Neurologic:  awake,alert        Data Review:    Review and/or order of clinical lab test      Labs:     No results for input(s): WBC, HGB, HCT, PLT, HGBEXT, HCTEXT, PLTEXT, HGBEXT, HCTEXT, PLTEXT in the last 72 hours. No results for input(s): NA, K, CL, CO2, BUN, CREA, GLU, CA, MG, PHOS, URICA in the last 72 hours. No results for input(s): ALT, AP, TBIL, TBILI, TP, ALB, GLOB, GGT, AML, LPSE in the last 72 hours. No lab exists for component: SGOT, GPT, AMYP, HLPSE  No results for input(s): INR, PTP, APTT, INREXT, INREXT in the last 72 hours. No results for input(s): FE, TIBC, PSAT, FERR in the last 72 hours. No results found for: FOL, RBCF   No results for input(s): PH, PCO2, PO2 in the last 72 hours. No results for input(s): CPK, CKNDX, TROIQ in the last 72 hours.     No lab exists for component: CPKMB  No results found for: CHOL, CHOLX, CHLST, CHOLV, HDL, HDLP, LDL, LDLC, DLDLP, TGLX, TRIGL, TRIGP, CHHD, CHHDX  Lab Results   Component Value Date/Time    Glucose (POC) 102 01/31/2022 01:15 PM     No results found for: COLOR, APPRN, SPGRU, REFSG, MARIO, PROTU, GLUCU, KETU, BILU, UROU, ALLAN, LEUKU, GLUKE, EPSU, BACTU, WBCU, RBCU, CASTS, UCRY      Medications Reviewed:     Current Facility-Administered Medications   Medication Dose Route Frequency    [Held by provider] OLANZapine (ZyPREXA) tablet 2.5 mg  2.5 mg Oral QPM    traMADoL (ULTRAM) tablet 50 mg  50 mg Oral BID    polyethylene glycol (MIRALAX) packet 17 g  17 g Oral DAILY    [Held by provider] haloperidoL (HALDOL) tablet 1 mg  1 mg Oral BID    acetaminophen (TYLENOL) tablet 650 mg  650 mg Oral Q8H    lidocaine 4 % patch 2 Patch  2 Patch TransDERmal Q24H    sodium chloride (NS) flush 5-40 mL  5-40 mL IntraVENous Q8H    sodium chloride (NS) flush 5-40 mL  5-40 mL IntraVENous PRN    acetaminophen (TYLENOL) tablet 650 mg  650 mg Oral Q4H PRN    morphine injection 1 mg  1 mg IntraVENous Q4H PRN    heparin (porcine) injection 5,000 Units  5,000 Units SubCUTAneous Q8H    hydrALAZINE (APRESOLINE) 20 mg/mL injection 10 mg  10 mg IntraVENous Q6H PRN     ______________________________________________________________________  EXPECTED LENGTH OF STAY: 3d 12h  ACTUAL LENGTH OF STAY:          12                 Colton Samuel MD

## 2022-02-05 NOTE — PROGRESS NOTES
Pt in restroom. Pt went to stand from toilet and tripped on pants resulting in patient falling to ground. No injury present, and pt has no complaints of pain post-fall. Pt assisted to chair, and vitals obtained.

## 2022-02-05 NOTE — PROGRESS NOTES
[address] [facility] Adult  Hospitalist Group                                                                                          Hospitalist Progress Note  Denilson Pedroza MD  Answering service: [phone] or [phone] from in house phone        Date of Service:  2022  NAME:  Abbey Vance  :  1935  MRN:  [id number]      Admission Summary:   History was primarily obtained from the patient's daughter, patient is alert, is somewhat confused, suspect underlying dementia, patient apparently presented with back pain that started a few days back, patient reports that she is not sure whether she had a fall or not but as far she remembers she has not had any falls or injuries, patient reports that it is hard for her to walk because she continues to have significant pain, came to the ER yesterday, who was found to have multiple thoracic compression fractures, the daughter reports that she has had history of thoracic compression fractures in the past, the daughter also reports that patient is having some shortness of breath on exertion, no fever chills, has had received vaccine focal    Interval history / Subjective:     She was sitting in the chair, states that staff are very nice and the food if good  She agreed for COVID test when CM spoke     Assessment & Plan:     Acute T7 and T9 compression fx with pain  Upper and lower back pain    - s/p kypho-NEED OP DEXA SCAN  -PT OT   - pain control with Tylenol  - On Tramadol BID prn  - bowel regimen    Mild hyponatremia: labs pending    Chronic diastolic CHF  -Euvolemic and not acutely decompensated. -Can follow with cardiology outpatient. Possible moderately advanced Dementia  - Neuro eval noted, likely has underlying moderate dementia, needs formal eval in outpatient setting.        Code status: full code,   DVT prophylaxis: heparin      Care Plan discussed with: Patient/Family,discussed with daughter   Anticipated Disposition: snf     Hospital Problems Date Reviewed: 1/31/2022          Codes Class Noted POA    * (Principal) Back pain ICD-10-CM: M54.9  ICD-9-CM: 724.5  1/31/2022 Yes        CHF (congestive heart failure) (HCC) ICD-10-CM: I50.9  ICD-9-CM: 428.0  1/24/2022 Unknown                Review of Systems:   A comprehensive review of systems was negative except for that written in the HPI. Vital Signs:    Last 24hrs VS reviewed since prior progress note. Most recent are:  Visit Vitals  BP (!) 159/86   Pulse (!) 56   Temp 97.7 °F (36.5 °C)   Resp 16   Ht 5' 7\" (1.702 m)   Wt 46.8 kg (103 lb 1.6 oz)   SpO2 97%   BMI 16.15 kg/m²       No intake or output data in the 24 hours ending 02/05/22 1103     Physical Examination:     I had a face to face encounter with this patient and independently examined them on 2/5/2022 as outlined below:          Constitutional:  No acute distress, cooperative,    Resp:  CTA bilaterally. CV:  Regular rhythm, normal rate, S1,S2 wnl    GI:  Soft, non distended, non tender. normoactive bowel sounds,    Musculoskeletal/  :  No edema     Neurologic:  awake,alert,follows commands,does not answer questions appropraitely         Data Review:    Review and/or order of clinical lab test      Labs:     No results for input(s): WBC, HGB, HCT, PLT, HGBEXT, HCTEXT, PLTEXT, HGBEXT, HCTEXT, PLTEXT in the last 72 hours. No results for input(s): NA, K, CL, CO2, BUN, CREA, GLU, CA, MG, PHOS, URICA in the last 72 hours. No results for input(s): ALT, AP, TBIL, TBILI, TP, ALB, GLOB, GGT, AML, LPSE in the last 72 hours. No lab exists for component: SGOT, GPT, AMYP, HLPSE  No results for input(s): INR, PTP, APTT, INREXT, INREXT in the last 72 hours. No results for input(s): FE, TIBC, PSAT, FERR in the last 72 hours. No results found for: FOL, RBCF   No results for input(s): PH, PCO2, PO2 in the last 72 hours. No results for input(s): CPK, CKNDX, TROIQ in the last 72 hours.     No lab exists for component: CPKMB  No results found for: CHOL, CHOLX, CHLST, CHOLV, HDL, HDLP, LDL, LDLC, DLDLP, TGLX, TRIGL, TRIGP, CHHD, CHHDX  Lab Results   Component Value Date/Time    Glucose (POC) 102 01/31/2022 01:15 PM     No results found for: COLOR, APPRN, SPGRU, REFSG, MARIO, PROTU, GLUCU, KETU, BILU, UROU, ALLAN, LEUKU, GLUKE, EPSU, BACTU, WBCU, RBCU, CASTS, UCRY      Medications Reviewed:     Current Facility-Administered Medications   Medication Dose Route Frequency    [Held by provider] OLANZapine (ZyPREXA) tablet 2.5 mg  2.5 mg Oral QPM    traMADoL (ULTRAM) tablet 50 mg  50 mg Oral BID    polyethylene glycol (MIRALAX) packet 17 g  17 g Oral DAILY    [Held by provider] haloperidoL (HALDOL) tablet 1 mg  1 mg Oral BID    acetaminophen (TYLENOL) tablet 650 mg  650 mg Oral Q8H    lidocaine 4 % patch 2 Patch  2 Patch TransDERmal Q24H    sodium chloride (NS) flush 5-40 mL  5-40 mL IntraVENous Q8H    sodium chloride (NS) flush 5-40 mL  5-40 mL IntraVENous PRN    acetaminophen (TYLENOL) tablet 650 mg  650 mg Oral Q4H PRN    morphine injection 1 mg  1 mg IntraVENous Q4H PRN    heparin (porcine) injection 5,000 Units  5,000 Units SubCUTAneous Q8H    hydrALAZINE (APRESOLINE) 20 mg/mL injection 10 mg  10 mg IntraVENous Q6H PRN     ______________________________________________________________________  EXPECTED LENGTH OF STAY: 3d 12h  ACTUAL LENGTH OF STAY:          12                 Sukhjinder Webb MD

## 2022-02-05 NOTE — PROGRESS NOTES
[facility] [facility] Adult  Hospitalist Group                                                                                          Hospitalist Progress Note  Wade Middleton MD  Answering service: [phone] OR [phone] from in house phone        Date of Service:  2022  NAME:  Ciera Hernandes  :  1935  MRN:  [id number]      Admission Summary:   History was primarily obtained from the patient's daughter, patient is alert, is somewhat confused, suspect underlying dementia, patient apparently presented with back pain that started a few days back, patient reports that she is not sure whether she had a fall or not but as far she remembers she has not had any falls or injuries, patient reports that it is hard for her to walk because she continues to have significant pain, came to the ER yesterday, who was found to have multiple thoracic compression fractures, the daughter reports that she has had history of thoracic compression fractures in the past, the daughter also reports that patient is having some shortness of breath on exertion, no fever chills, has had received vaccine focal    Interval history / Subjective:   F/u compression fractures    Sitting in the chair, tangential conversation asked where she was \"dont you know that,ask about national geography\"  Does not want COVID test  Discussed with daughter to talk to patient about rehab and COVID test     Assessment & Plan:     Acute T7 and T9 compression fx with pain  Upper and lower back pain    - s/p kypho  -PT OT   - pain control with Tylenol  - On Tramadol BID   - bowel regimen    Mild hyponatremia: repeat labs tomorrow    Chronic diastolic CHF  -Euvolemic and not acutely decompensated. -Can follow with cardiology outpatient.     Possible moderately advanced Dementia  - Neuro eval noted, likely has underlying moderate dementia, needs formal eval in outpatient setting.   -     Code status: full code,   DVT prophylaxis: heparin    Ameena    Care Plan discussed with: Patient/Family  Anticipated Disposition: [facility]     Hospital Problems  Date Reviewed: 1/31/2022          Codes Class Noted POA    * (Principal) Back pain ICD-10-CM: M54.9  ICD-9-CM: 724.5  1/31/2022 Yes        CHF (congestive heart failure) (HCC) ICD-10-CM: I50.9  ICD-9-CM: 428.0  1/24/2022 Unknown                Review of Systems:   A comprehensive review of systems was negative except for that written in the HPI. Vital Signs:    Last 24hrs VS reviewed since prior progress note. Most recent are:  Visit Vitals  /74   Pulse 60   Temp 97.5 °F (36.4 °C)   Resp 16   Ht 5' 7\" (1.702 m)   Wt 46.8 kg (103 lb 1.6 oz)   SpO2 96%   BMI 16.15 kg/m²         Intake/Output Summary (Last 24 hours) at 2/5/2022 0015  Last data filed at 2/4/2022 7835  Gross per 24 hour   Intake 120 ml   Output --   Net 120 ml        Physical Examination:     I had a face to face encounter with this patient and independently examined them on 2/5/2022 as outlined below:          Constitutional:  No acute distress, cooperative,    Resp:  CTA bilaterally. CV:  Regular rhythm, normal rate, S1,S2 wnl    GI:  Soft, non distended, non tender. normoactive bowel sounds,    Musculoskeletal/  :  No edema     Neurologic:  awake,alert,follows commands,does not answer questions appropraitely         Data Review:    Review and/or order of clinical lab test      Labs:     No results for input(s): WBC, HGB, HCT, PLT, HGBEXT, HCTEXT, PLTEXT, HGBEXT, HCTEXT, PLTEXT in the last 72 hours. No results for input(s): NA, K, CL, CO2, BUN, CREA, GLU, CA, MG, PHOS, URICA in the last 72 hours. No results for input(s): ALT, AP, TBIL, TBILI, TP, ALB, GLOB, GGT, AML, LPSE in the last 72 hours. No lab exists for component: SGOT, GPT, AMYP, HLPSE  No results for input(s): INR, PTP, APTT, INREXT, INREXT in the last 72 hours. No results for input(s): FE, TIBC, PSAT, FERR in the last 72 hours.    No results found for: FOL, RBCF   No results for input(s): PH, PCO2, PO2 in the last 72 hours. No results for input(s): CPK, CKNDX, TROIQ in the last 72 hours.     No lab exists for component: CPKMB  No results found for: CHOL, CHOLX, CHLST, CHOLV, HDL, HDLP, LDL, LDLC, DLDLP, TGLX, TRIGL, TRIGP, CHHD, CHHDX  Lab Results   Component Value Date/Time    Glucose (POC) 102 01/31/2022 01:15 PM     No results found for: COLOR, APPRN, SPGRU, REFSG, MARIO, PROTU, GLUCU, KETU, BILU, UROU, ALLAN, LEUKU, GLUKE, EPSU, BACTU, WBCU, RBCU, CASTS, UCRY      Medications Reviewed:     Current Facility-Administered Medications   Medication Dose Route Frequency    [Held by provider] OLANZapine (ZyPREXA) tablet 2.5 mg  2.5 mg Oral QPM    traMADoL (ULTRAM) tablet 50 mg  50 mg Oral BID    polyethylene glycol (MIRALAX) packet 17 g  17 g Oral DAILY    [Held by provider] haloperidoL (HALDOL) tablet 1 mg  1 mg Oral BID    acetaminophen (TYLENOL) tablet 650 mg  650 mg Oral Q8H    lidocaine 4 % patch 2 Patch  2 Patch TransDERmal Q24H    sodium chloride (NS) flush 5-40 mL  5-40 mL IntraVENous Q8H    sodium chloride (NS) flush 5-40 mL  5-40 mL IntraVENous PRN    acetaminophen (TYLENOL) tablet 650 mg  650 mg Oral Q4H PRN    morphine injection 1 mg  1 mg IntraVENous Q4H PRN    heparin (porcine) injection 5,000 Units  5,000 Units SubCUTAneous Q8H    hydrALAZINE (APRESOLINE) 20 mg/mL injection 10 mg  10 mg IntraVENous Q6H PRN     ______________________________________________________________________  EXPECTED LENGTH OF STAY: 3d 12h  ACTUAL LENGTH OF STAY:          12                 Ginny White MD

## 2022-02-05 NOTE — PROGRESS NOTES
Transition of Care Plan   RUR- 11% Moderate Risk   DISPOSITION: The disposition plan is to transition to a SNF - [facility] - [phone].  Rapid Covid Test completed on 2/5/22    F/U with PCP/Specialist     [facility]: [facility] - scheduled for 10:00am on 2/6/22. Per previous CM note, Patient has been accepted to [facility]. Auth ID [id number]. Patient has been approved for (4) days with start date of 2/522 and next review date of 2/8/22. Assigned care coordinator is Ghislaine Galvan. Fax updates to [phone]. At 10:30am -  met with patient at bedside to check in. CM attempted to explain recommendation and the need for a COVID test. Patient verbally agreed to completing the covid testing needed for facility. [address] [place] [address] [phone]     At 10:59am -  contacted Nishant Butts at the [address] [place] [address] to provide the above update. CM spoke with [facility]  who is requesting that patient be discharged in the morning due to limited staffing. At 11:04am -  spoke with attending who confirms that patient can discharge in the morning. Patient has also been recommended for a rolling walker. At 11:15am -  submitted order for rolling walker for review via all scripts. [facility] [phone] - pending review. [facility] [phone] - pending review. [facility] [phone] - pending review. Patient has completed the rapid covid swab. Patient to transition to facility tomorrow morning. At 11:20am -  attempted to contact admin coordinator Avis Ambriz at the [address] [place] [address] to discuss transportation  time for tomorrow. CM left a voice message awaiting call back. At 11:27am -  contacted patients daughter Bernadette Summers to provide the above update. Once room number and confirmed time of [facility] transportation is confirmed CM will update.     At 11:38am - CM received a call from Larry Kieramaryuri at the [facility] who provided the below information:    Call to report number: [phone] - ask for the [facility] AT [facility] Unit  Room Number: 405  [facility]  time: 10:00am on 2/6/22    At 11:39am - CM attempted to contact patients daughter to provide most recent update. CM unable to leave  at this time. CM provided verbal/written update to patient at bedside. At 11:58am - CM contacted [facility] [phone] o follow up regarding referral that was submitted via all scripts for the rolling walker. CM awaiting call back./ CM encouraged to complete paper work in office (DME closet and provide patient with rolling walker from the CM office)    At 12:02pm - CM contacted [employer]  [phone] to follow up regarding referral that was submitted via all scripts for the rolling walker. CM awaiting. / CM received a call back and Angie Garcia reports, Lalo Florence is no one here to run the insurance until Monday. \"    At 12:45pm - CM went to CM office to retrieve rolling walker and fill out information for [facility], because patient is going to [facility], she will not discharge [place] with a rolling walker. Patient to be evaluated by therapy at facility and if she will need a rolling walker at discharge from facility, facility will then complete necessary steps to get patient recommended DME. CM provided patient and patients nurse with above update. [facility] packet has been placed at bedside chart. AVS has been updated.     Kylah Fisher, EVA, CRM, LMHP-e

## 2022-02-06 VITALS
SYSTOLIC BLOOD PRESSURE: 129 MMHG | RESPIRATION RATE: 18 BRPM | WEIGHT: 103.1 LBS | HEIGHT: 67 IN | TEMPERATURE: 97.9 F | HEART RATE: 68 BPM | BODY MASS INDEX: 16.18 KG/M2 | DIASTOLIC BLOOD PRESSURE: 80 MMHG | OXYGEN SATURATION: 100 %

## 2022-02-06 PROCEDURE — 94760 N-INVAS EAR/PLS OXIMETRY 1: CPT

## 2022-02-06 PROCEDURE — 74011250637 HC RX REV CODE- 250/637: Performed by: HOSPITALIST

## 2022-02-06 RX ORDER — TRAMADOL HYDROCHLORIDE 50 MG/1
25 TABLET ORAL
Qty: 3 TABLET | Refills: 0 | Status: SHIPPED | OUTPATIENT
Start: 2022-02-06 | End: 2022-02-13

## 2022-02-06 RX ORDER — FERROUS SULFATE, DRIED 160(50) MG
1 TABLET, EXTENDED RELEASE ORAL
Qty: 1 TABLET | Refills: 0 | Status: SHIPPED
Start: 2022-02-07

## 2022-02-06 RX ORDER — ACETAMINOPHEN 325 MG/1
650 TABLET ORAL
Qty: 1 TABLET | Refills: 0 | Status: SHIPPED
Start: 2022-02-06

## 2022-02-06 RX ADMIN — ACETAMINOPHEN 650 MG: 325 TABLET ORAL at 00:13

## 2022-02-06 NOTE — DISCHARGE SUMMARY
Discharge Summary       PATIENT ID: Patric Harry  MRN: [id number]   YOB: 1935    DATE OF ADMISSION: 1/24/2022  4:15 PM    DATE OF DISCHARGE: 2/6/2022   PRIMARY CARE PROVIDER: Amish Narayanan MD     ATTENDING PHYSICIAN: Jenelle Jean-Baptiste MD    DISCHARGING PROVIDER: Toño Avitia MD    To contact this individual call [phone] and ask the  to page. If unavailable ask to be transferred the Adult Hospitalist Department. CONSULTATIONS: IP CONSULT TO CARDIOLOGY  IP CONSULT TO NEUROSURGERY  IP CONSULT TO NEUROLOGY  IP CONSULT TO INTERVENTIONAL RADIOLOGY    PROCEDURES/SURGERIES: * No surgery found *    DISCHARGE DIAGNOSES:   [address] COURSE:     DISCHARGE DIAGNOSES / PLAN:      Per H and P\"History was primarily obtained from the patient's daughter, patient is alert, is somewhat confused, suspect underlying dementia, patient apparently presented with back pain that started a few days back, patient reports that she is not sure whether she had a fall or not but as far she remembers she has not had any falls or injuries, patient reports that it is hard for her to walk because she continues to have significant pain, came to the ER yesterday, who was found to have multiple thoracic compression fractures, the daughter reports that she has had history of thoracic compression fractures in the past, the daughter also reports that patient is having some shortness of breath on exertion, no fever chills, has had received vaccine focal\". Acute T7 and T9 compression fx with pain  Upper and lower back pain     - s/p kypho-NEED OP DEXA SCAN  -PT OT   - pain control with Tylenol  - On Tramadol BID prn  - bowel regimen     Mild hyponatremia: labs pending     Chronic diastolic CHF  -Euvolemic and not acutely decompensated.   -Can follow with cardiology outpatient.     Possible moderately advanced Dementia  - Neuro eval noted, likely has underlying moderate dementia, needs formal eval in outpatient setting.      IR KYPHOPLASTY THORACIC    Result Date: 1/31/2022  Clinical Data: 66-year-old female with osteoporosis and acute traumatic T7 and T9 vertebral body compression fractures presents for kyphoplasty. : Melissa Cruz MD Estimated Blood Loss: Minimal Specimens: None Complications: None Procedure: 1. Fluoroscopy guided placement of cannulas into the bilateral T7 and T9 pedicles. 2. Balloon kyphoplasty of the T7 and T9 vertebral bodies with cement augmentation. 3. IV conscious sedation. Moderate intravenous conscious sedation was supervised by Dr. Carole Amaro. The patient was independently monitored by a registered nurse assigned to the Department of Radiology using automated blood pressure, EKG, and pulse oximetry. The detail conscious sedation record is stored in the hospital information system. Medication: Versed: 2 mg Fentanyl: 100 mcg Intraprocedure time: 56 minutes FLUOROSCOPY TIME: 18 min FLUOROSCOPY DOSE (air kerma): 158 mGy Body: Following discussion of risks, benefits and alternatives, informed written consent was obtained. The patient was placed prone on the fluoroscopic exam table and prepped and draped in sterile fashion. The T7 and T9 levels were identified fluoroscopically. We initially focused on the T7 vertebral body. 1% lidocaine was administered to the subcutaneous tissues for local anesthesia, and 0.25% bupivacaine was administered into the periosteum of of the bilateral pedicles under fluoroscopic guidance using a 22-gauge 10 cm spinal needle for local anesthesia. Under biplane fluoroscopic guidance, the the right T7 pedicle was traversed with a Kyphon cannula. The tip of the cannula was placed just within the vertebral body. A drill was then advanced to within the anterior portion of the vertebral body. This procedure was repeated on the left T7 pedicle. Next we moved to the T9 vertebral body.  1% lidocaine was administered to the subcutaneous tissues for local anesthesia, and 0.25% bupivacaine was administered into the periosteum of of the bilateral pedicles under fluoroscopic guidance using a 22-gauge 10 cm spinal needle for local anesthesia. Under biplane fluoroscopic guidance, the the right T9 pedicle was traversed with a Kyphon cannula. The tip of the cannula was placed just within the vertebral body. A drill was then advanced to within the anterior portion of the vertebral body. This procedure was repeated on the left T9 pedicle. Kyphoplasty balloons were then inserted into each cannula at T7 and T9, and inflated gently to create space for cement augmentation. The balloons were deflated and removed. Then, cement was infused bilaterally at each level under fluoroscopic guidance. The cannulas were removed and bandages were applied. There were no immediate complications. The patient tolerated the procedure well and left the interventional radiology suite in stable condition. 1.  Osteoporotic acute T7 and T9 vertebral body fractures, which were successfully treated with kyphoplasty. Plan: 1. Recommend testing and treatment for osteoporosis, if not already performed. 23X     MRI [facility] SPINE WO CONT    Result Date: 1/27/2022  EXAM: MRI [facility] SPINE WO CONT INDICATION: BACK PAIN. COMPARISON: MRI 3/26/2020 TECHNIQUE: MR imaging of the thoracic spine was performed using the following sequences: sagittal T1, T2, stir; axial T1, T2. CONTRAST: None. FINDINGS: Acute T7 and T9 vertebral compression fractures are shown appearing moderate T7 and moderate to severe at T9. The degree of osseous signal alteration is greater in the T9 vertebral body. There is up to 70% loss of vertebral body height at T7 and 90% loss of vertebral body height at T9. Multiple remote vertebral compression fractures are again noted including mild T4, moderate T5, moderate-severe T6, moderate T8, moderate-severe T11, moderate T12, mild L1 and moderate L2 vertebral compression fractures.  Previously demonstrated acute fracture or edema at T11-T12 is resolved. A severe kyphosis is demonstrated which is centered at T8, estimated at 92 degrees. There is mild retropositioning of several vertebral bodies, predominantly the T11 and T12 vertebral bodies which is unchanged in appearance and measuring up to 4 mm. No substantial listhesis of the acute fracture segments is shown. Cord signal is normal. The conus terminates at L2. No intraspinal or paraspinal soft tissue mass is shown. There is diffuse mid and lower thoracic spondylosis as well as upper lumbar spondylosis with disc bulging especially prominent at L2-3 in the left lateral and foraminal regions. No thoracic spine canal stenosis is shown. Foraminal stenosis is shown bilaterally at T10-11 through T12-L1. Multilevel vertebral compression fractures, acute at T7 and T9 with details above. 23X     CT HEAD WO CONT    Result Date: 1/26/2022  INDICATION: ? fall Exam: Noncontrast CT of the brain is performed with 5 mm collimation. CT dose reduction was achieved with the use of the standardized protocol tailored for this examination and automatic exposure control for dose modulation. FINDINGS: There is age-appropriate diffuse cortical atrophy. There is no acute intracranial hemorrhage, mass, mass effect or herniation. Ventricular system is normal. There are periventricular white matter hypodensities consistent with chronic microvascular ischemic changes. There is no evidence of acute territorial infarct. The mastoid air cells are well pneumatized. The visualized paranasal sinuses are normal.     No acute intracranial hemorrhage, mass or infarct. CTA CHEST W OR W WO CONT    Result Date: 1/24/2022  EXAM: CT thorax PE INDICATION: Chest pain. Evaluation for aortic dissection. COMPARISON: Thoracic MRI performed 3/26/2020. CONTRAST: 100 mL of Isovue-370. TECHNIQUE: Precontrast  images were obtained to localize the volume for acquisition.  Multislice helical CT arteriography was performed from the diaphragm to the thoracic inlet during uneventful rapid bolus intravenous contrast administration. Lung and soft tissue windows were generated. Coronal and sagittal images were generated and 3D post processing consisting of coronal maximum intensity images was performed. CT dose reduction was achieved through use of a standardized protocol tailored for this examination and automatic exposure control for dose modulation. FINDINGS: The lungs are clear of mass, nodule, airspace disease or edema. The pulmonary arteries are well enhanced and no pulmonary emboli are identified. The heart is enlarged. There is no mediastinal or hilar adenopathy or mass. The aorta enhances normally without evidence of aneurysm or dissection. There are multiple severe thoracic compression fractures in a patient with a level convex scoliosis. The visualized portions of the upper abdominal organs are normal.     No evidence for aortic dissection. Cardiomegaly Multiple thoracic compression fractures, stable    CT ABD PELV W CONT    Result Date: 1/25/2022  EXAM:  CT ABD PELV W CONT INDICATION: Abdominal pain COMPARISON: MRI lumbar spine 3/26/2022. TECHNIQUE: Helical CT of the abdomen  and pelvis  following the uneventful intravenous administration of nonionic contrast.  Coronal and sagittal reformats are performed. CT dose reduction was achieved through use of a standardized protocol tailored for this examination and automatic exposure control for dose modulation. FINDINGS: The visualized lung bases demonstrate no mass or consolidation. There is stable cardiomegaly. There is no pericardial or pleural effusion. The liver, spleen, pancreas, and adrenal glands are normal. The gall bladder is present  without intra- or extra-hepatic biliary dilatation. The kidneys are symmetric without hydronephrosis. There are no dilated bowel loops. The appendix is surgically absent.  There is extensive sigmoid diverticulosis without focal adjacent inflammation. There are no enlarged lymph nodes. There is no free fluid or free air. The urinary bladder is normal.  There is no pelvic mass. There is stable chronic lumbar compression fractures. There is no aggressive bony lesion. No acute abnormality in the abdomen or pelvis. ECHO ADULT COMPLETE    Result Date: 1/25/2022    Left Ventricle: Left ventricle size is normal. Normal wall thickness. Normal wall motion. Normal left ventricular systolic function with a visually estimated EF of 65 - 70%.   Right Ventricle: Not well visualized.   Aortic Valve: Moderate transvalvular regurgitation. 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 Moderate transvalvular regurgitation.   Tricuspid Valve: Findings consistent with myxomatous degeneration.   Left Atrium: Left atrium is severely dilated.   Right Atrium: Right atrium is severely dilated. NOTIFY YOUR PHYSICIAN FOR ANY OF THE FOLLOWING:   Fever over 101 degrees for 24 hours. Chest pain, shortness of breath, fever, chills, nausea, vomiting, diarrhea, change in mentation, falling, weakness, bleeding. Severe pain or pain not relieved by medications, as well as any other signs or symptoms that you may have questions about. FOLLOW UP APPOINTMENTS:    Follow-up Information     Follow up With Specialties Details Why [address] [facility] On 2/6/2022 [address] [address]  [phone]    Trae Centeno MD Family Medicine In 1 week  [address]  [phone]                   DISCHARGE MEDICATIONS:  Current Discharge Medication List      START taking these medications    Details   acetaminophen (TYLENOL) 325 mg tablet Take 2 Tablets by mouth every six (6) hours as needed for Pain.   Qty: 1 Tablet, Refills: 0  Start date: 2/6/2022      calcium-vitamin D (OS-ARCELIA +D3) 500 mg-200 unit per tablet Take 1 Tablet by mouth daily (with breakfast). Qty: 1 Tablet, Refills: 0  Start date: 2/7/2022      traMADoL (ULTRAM) 50 mg tablet Take 0.5 Tablets by mouth every twelve (12) hours as needed for Pain for up to 7 days. Max Daily Amount: 50 mg.  Qty: 3 Tablet, Refills: 0  Start date: 2/6/2022, End date: 2/13/2022    Associated Diagnoses: Compression fracture of thoracic vertebra, unspecified thoracic vertebral level, initial encounter ([facility]             DISPOSITION:    Home With:   OT  PT  HH  RN      x Long term SNF/Inpatient Rehab    Independent/assisted living    Hospice    Other:       PATIENT CONDITION AT DISCHARGE:     Functional status    Poor    x Deconditioned     Independent      Cognition     Lucid     Forgetful    x Dementia      Catheters/lines (plus indication)    Stanley     PICC     PEG    x None      Code status   x  Full code     DNR      PHYSICAL EXAMINATION AT DISCHARGE:    Constitutional:  No acute distress, cooperative,    Resp:  CTA bilaterally. CV:  Regular rhythm, normal rate, S1,S2 wnl    GI:  Soft, non distended, non tender.  normoactive bowel sounds,    Musculoskeletal/  :  No edema     Neurologic:  awake,alert,follows commands,does not answer questions appropraitely          CHRONIC MEDICAL DIAGNOSES:  Problem List as of 2/6/2022 Date Reviewed: 1/31/2022          Codes Class Noted - Resolved    * (Principal) Back pain ICD-10-CM: M54.9  ICD-9-CM: 724.5  1/31/2022 - Present        CHF (congestive heart failure) ([facility] ICD-10-CM: I50.9  ICD-9-CM: 428.0  1/24/2022 - Present              30  minutes were spent with the patient on counseling and coordination of care    Signed:   Sukhjinder Webb MD  2/6/2022  9:36 AM

## 2022-02-06 NOTE — DISCHARGE INSTRUCTIONS
Discharge Summary       PATIENT ID: Sarika Quevedo  MRN: [id number]   YOB: 1935    DATE OF ADMISSION: 1/24/2022  4:15 PM    DATE OF DISCHARGE: 2/6/2022   PRIMARY CARE PROVIDER: Saintclair Milling, MD     ATTENDING PHYSICIAN: Jo Ann Easley MD    DISCHARGING PROVIDER: Alyce Brunner, MD    To contact this individual call [phone] and ask the  to page. If unavailable ask to be transferred the Adult Hospitalist Department. CONSULTATIONS: IP CONSULT TO CARDIOLOGY  IP CONSULT TO NEUROSURGERY  IP CONSULT TO NEUROLOGY  IP CONSULT TO INTERVENTIONAL RADIOLOGY    PROCEDURES/SURGERIES: * No surgery found *    DISCHARGE DIAGNOSES:   [address] [facility],[address] COURSE:     DISCHARGE DIAGNOSES / PLAN:      Per H and P\"History was primarily obtained from the patient's daughter, patient is alert, is somewhat confused, suspect underlying dementia, patient apparently presented with back pain that started a few days back, patient reports that she is not sure whether she had a fall or not but as far she remembers she has not had any falls or injuries, patient reports that it is hard for her to walk because she continues to have significant pain, came to the ER yesterday, who was found to have multiple thoracic compression fractures, the daughter reports that she has had history of thoracic compression fractures in the past, the daughter also reports that patient is having some shortness of breath on exertion, no fever chills, has had received vaccine focal\". Acute T7 and T9 compression fx with pain  Upper and lower back pain     - s/p kypho-NEED OP DEXA SCAN  -PT OT   - pain control with Tylenol  - On Tramadol BID prn  - bowel regimen     Mild hyponatremia: labs pending     Chronic diastolic CHF  -Euvolemic and not acutely decompensated.   -Can follow with cardiology outpatient.     Possible moderately advanced Dementia  - Neuro eval noted, likely has underlying moderate dementia, needs formal eval in outpatient setting.      IR KYPHOPLASTY THORACIC    Result Date: 1/31/2022  Clinical Data: 51-year-old female with osteoporosis and acute traumatic T7 and T9 vertebral body compression fractures presents for kyphoplasty. : Arnel Hansen MD Estimated Blood Loss: Minimal Specimens: None Complications: None Procedure: 1. Fluoroscopy guided placement of cannulas into the bilateral T7 and T9 pedicles. 2. Balloon kyphoplasty of the T7 and T9 vertebral bodies with cement augmentation. 3. IV conscious sedation. Moderate intravenous conscious sedation was supervised by Dr. Mortimer Kub. The patient was independently monitored by a registered nurse assigned to the Department of Radiology using automated blood pressure, EKG, and pulse oximetry. The detail conscious sedation record is stored in the hospital information system. Medication: Versed: 2 mg Fentanyl: 100 mcg Intraprocedure time: 56 minutes FLUOROSCOPY TIME: 18 min FLUOROSCOPY DOSE (air kerma): 158 mGy Body: Following discussion of risks, benefits and alternatives, informed written consent was obtained. The patient was placed prone on the fluoroscopic exam table and prepped and draped in sterile fashion. The T7 and T9 levels were identified fluoroscopically. We initially focused on the T7 vertebral body. 1% lidocaine was administered to the subcutaneous tissues for local anesthesia, and 0.25% bupivacaine was administered into the periosteum of of the bilateral pedicles under fluoroscopic guidance using a 22-gauge 10 cm spinal needle for local anesthesia. Under biplane fluoroscopic guidance, the the right T7 pedicle was traversed with a Kyphon cannula. The tip of the cannula was placed just within the vertebral body. A drill was then advanced to within the anterior portion of the vertebral body. This procedure was repeated on the left T7 pedicle. Next we moved to the T9 vertebral body.  1% lidocaine was administered to the subcutaneous tissues for local anesthesia, and 0.25% bupivacaine was administered into the periosteum of of the bilateral pedicles under fluoroscopic guidance using a 22-gauge 10 cm spinal needle for local anesthesia. Under biplane fluoroscopic guidance, the the right T9 pedicle was traversed with a Kyphon cannula. The tip of the cannula was placed just within the vertebral body. A drill was then advanced to within the anterior portion of the vertebral body. This procedure was repeated on the left T9 pedicle. Kyphoplasty balloons were then inserted into each cannula at T7 and T9, and inflated gently to create space for cement augmentation. The balloons were deflated and removed. Then, cement was infused bilaterally at each level under fluoroscopic guidance. The cannulas were removed and bandages were applied. There were no immediate complications. The patient tolerated the procedure well and left the interventional radiology suite in stable condition. 1.  Osteoporotic acute T7 and T9 vertebral body fractures, which were successfully treated with kyphoplasty. Plan: 1. Recommend testing and treatment for osteoporosis, if not already performed. 23X     MRI [facility] SPINE WO CONT    Result Date: 1/27/2022  EXAM: MRI [facility] SPINE WO CONT INDICATION: BACK PAIN. COMPARISON: MRI 3/26/2020 TECHNIQUE: MR imaging of the thoracic spine was performed using the following sequences: sagittal T1, T2, stir; axial T1, T2. CONTRAST: None. FINDINGS: Acute T7 and T9 vertebral compression fractures are shown appearing moderate T7 and moderate to severe at T9. The degree of osseous signal alteration is greater in the T9 vertebral body. There is up to 70% loss of vertebral body height at T7 and 90% loss of vertebral body height at T9. Multiple remote vertebral compression fractures are again noted including mild T4, moderate T5, moderate-severe T6, moderate T8, moderate-severe T11, moderate T12, mild L1 and moderate L2 vertebral compression fractures.  Previously demonstrated acute fracture or edema at T11-T12 is resolved. A severe kyphosis is demonstrated which is centered at T8, estimated at 92 degrees. There is mild retropositioning of several vertebral bodies, predominantly the T11 and T12 vertebral bodies which is unchanged in appearance and measuring up to 4 mm. No substantial listhesis of the acute fracture segments is shown. Cord signal is normal. The conus terminates at L2. No intraspinal or paraspinal soft tissue mass is shown. There is diffuse mid and lower thoracic spondylosis as well as upper lumbar spondylosis with disc bulging especially prominent at L2-3 in the left lateral and foraminal regions. No thoracic spine canal stenosis is shown. Foraminal stenosis is shown bilaterally at T10-11 through T12-L1. Multilevel vertebral compression fractures, acute at T7 and T9 with details above. 23X     CT HEAD WO CONT    Result Date: 1/26/2022  INDICATION: ? fall Exam: Noncontrast CT of the brain is performed with 5 mm collimation. CT dose reduction was achieved with the use of the standardized protocol tailored for this examination and automatic exposure control for dose modulation. FINDINGS: There is age-appropriate diffuse cortical atrophy. There is no acute intracranial hemorrhage, mass, mass effect or herniation. Ventricular system is normal. There are periventricular white matter hypodensities consistent with chronic microvascular ischemic changes. There is no evidence of acute territorial infarct. The mastoid air cells are well pneumatized. The visualized paranasal sinuses are normal.     No acute intracranial hemorrhage, mass or infarct. CTA CHEST W OR W WO CONT    Result Date: 1/24/2022  EXAM: CT thorax PE INDICATION: Chest pain. Evaluation for aortic dissection. COMPARISON: Thoracic MRI performed 3/26/2020. CONTRAST: 100 mL of Isovue-370. TECHNIQUE: Precontrast  images were obtained to localize the volume for acquisition.  Multislice helical CT arteriography was performed from the diaphragm to the thoracic inlet during uneventful rapid bolus intravenous contrast administration. Lung and soft tissue windows were generated. Coronal and sagittal images were generated and 3D post processing consisting of coronal maximum intensity images was performed. CT dose reduction was achieved through use of a standardized protocol tailored for this examination and automatic exposure control for dose modulation. FINDINGS: The lungs are clear of mass, nodule, airspace disease or edema. The pulmonary arteries are well enhanced and no pulmonary emboli are identified. The heart is enlarged. There is no mediastinal or hilar adenopathy or mass. The aorta enhances normally without evidence of aneurysm or dissection. There are multiple severe thoracic compression fractures in a patient with a level convex scoliosis. The visualized portions of the upper abdominal organs are normal.     No evidence for aortic dissection. Cardiomegaly Multiple thoracic compression fractures, stable    CT ABD PELV W CONT    Result Date: 1/25/2022  EXAM:  CT ABD PELV W CONT INDICATION: Abdominal pain COMPARISON: MRI lumbar spine 3/26/2022. TECHNIQUE: Helical CT of the abdomen  and pelvis  following the uneventful intravenous administration of nonionic contrast.  Coronal and sagittal reformats are performed. CT dose reduction was achieved through use of a standardized protocol tailored for this examination and automatic exposure control for dose modulation. FINDINGS: The visualized lung bases demonstrate no mass or consolidation. There is stable cardiomegaly. There is no pericardial or pleural effusion. The liver, spleen, pancreas, and adrenal glands are normal. The gall bladder is present  without intra- or extra-hepatic biliary dilatation. The kidneys are symmetric without hydronephrosis. There are no dilated bowel loops. The appendix is surgically absent.  There is extensive sigmoid diverticulosis without focal adjacent inflammation. There are no enlarged lymph nodes. There is no free fluid or free air. The urinary bladder is normal.  There is no pelvic mass. There is stable chronic lumbar compression fractures. There is no aggressive bony lesion. No acute abnormality in the abdomen or pelvis. ECHO ADULT COMPLETE    Result Date: 1/25/2022    Left Ventricle: Left ventricle size is normal. Normal wall thickness. Normal wall motion. Normal left ventricular systolic function with a visually estimated EF of 65 - 70%.   Right Ventricle: Not well visualized.   Aortic Valve: Moderate transvalvular regurgitation. 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 Moderate transvalvular regurgitation.   Tricuspid Valve: Findings consistent with myxomatous degeneration.   Left Atrium: Left atrium is severely dilated.   Right Atrium: Right atrium is severely dilated. NOTIFY YOUR PHYSICIAN FOR ANY OF THE FOLLOWING:   Fever over 101 degrees for 24 hours. Chest pain, shortness of breath, fever, chills, nausea, vomiting, diarrhea, change in mentation, falling, weakness, bleeding. Severe pain or pain not relieved by medications, as well as any other signs or symptoms that you may have questions about. FOLLOW UP APPOINTMENTS:    Follow-up Information     Follow up With Specialties Details Why [address] [facility] On 2/6/2022 [address] [address]  [phone]    Martha Gomez MD Family Medicine In 1 week  [address]  [address]  [phone]                   DISCHARGE MEDICATIONS:  Current Discharge Medication List      START taking these medications    Details   acetaminophen (TYLENOL) 325 mg tablet Take 2 Tablets by mouth every six (6) hours as needed for Pain.   Qty: 1 Tablet, Refills: 0  Start date: 2/6/2022      calcium-vitamin D (OS-ARCELIA +D3) 500 mg-200 unit per tablet Take 1 Tablet by mouth daily (with breakfast). Qty: 1 Tablet, Refills: 0  Start date: 2/7/2022      traMADoL (ULTRAM) 50 mg tablet Take 0.5 Tablets by mouth every twelve (12) hours as needed for Pain for up to 7 days. Max Daily Amount: 50 mg.  Qty: 3 Tablet, Refills: 0  Start date: 2/6/2022, End date: 2/13/2022    Associated Diagnoses: Compression fracture of thoracic vertebra, unspecified thoracic vertebral level, initial encounter ([facility]             DISPOSITION:    Home With:   OT  PT  HH  RN      x Long term SNF/Inpatient Rehab    Independent/assisted living    Hospice    Other:       PATIENT CONDITION AT DISCHARGE:     Functional status    Poor    x Deconditioned     Independent      Cognition     Lucid     Forgetful    x Dementia      Catheters/lines (plus indication)    Stanley     PICC     PEG    x None      Code status   x  Full code     DNR      PHYSICAL EXAMINATION AT DISCHARGE:    Constitutional:  No acute distress, cooperative,    Resp:  CTA bilaterally. CV:  Regular rhythm, normal rate, S1,S2 wnl    GI:  Soft, non distended, non tender.  normoactive bowel sounds,    Musculoskeletal/  :  No edema     Neurologic:  awake,alert,follows commands,does not answer questions appropraitely          CHRONIC MEDICAL DIAGNOSES:  Problem List as of 2/6/2022 Date Reviewed: 1/31/2022          Codes Class Noted - Resolved    * (Principal) Back pain ICD-10-CM: M54.9  ICD-9-CM: 724.5  1/31/2022 - Present        CHF (congestive heart failure) ([facility] ICD-10-CM: I50.9  ICD-9-CM: 428.0  1/24/2022 - Present              30  minutes were spent with the patient on counseling and coordination of care    Signed:   Alyce Brunner, MD  2/6/2022  9:36 AM

## 2022-02-06 NOTE — PROGRESS NOTES
Report given to staff at the [address] [facility]. Daughter Dae Alcon called to notify of patients discharge and ride time. [facility] picked patient up at 10:25.

## 2022-02-17 NOTE — PROGRESS NOTES
Physician Progress Note      PATIENT:               Emelia Lozada  CSN #:                  [id number]  :                       1935  ADMIT DATE:       2022 4:15 PM  [place] DATE:        2022 11:02 AM  RESPONDING  PROVIDER #:        Tejas Ballesteros MD          QUERY TEXT:    Patient admitted with Compression Fractures of T7 and T9. Per RD documentation, pt has severe body fat and muscle mass wasting on exam, with BMI 17.85. If possible, please document in progress notes and discharge summary if you are evaluating and /or treating any of the following: The medical record reflects the following:  Risk Factors: Dementia  Clinical Indicators: pt found to have Compression fractures of T7 and T9. Pt noted to have BMI 17.85 on admission, with severe body fat/ muscle mass loss noted on exam. Per RD documentation, pt meets criteria for severe malnutrition based on inadequate protein energy intake aeb low BMI. Treatment: Ensure Enlive TID    ASPEN Criteria:  https://aspenjournals. onlinelibrary. bush. com/doi/full/10.1177/4351329435451418    Thank you,  Qian Foss RN  Clinical Documentation  [phone]  Options provided:  -- Protein calorie malnutrition severe  -- Protein calorie malnutrition unspecified  -- Other - I will add my own diagnosis  -- Disagree - Not applicable / Not valid  -- Disagree - Clinically unable to determine / Unknown  -- Refer to Clinical Documentation Reviewer    PROVIDER RESPONSE TEXT:    This patient has severe protein calorie malnutrition.     Query created by: Kady Cheek on 2022 11:54 AM      Electronically signed by:  Tejas Ballesteros MD 2022 7:13 AM

## 2022-03-19 PROBLEM — M54.9 BACK PAIN: Status: ACTIVE | Noted: 2022-01-31

## 2022-03-20 PROBLEM — I50.9 CHF (CONGESTIVE HEART FAILURE) (HCC): Status: ACTIVE | Noted: 2022-01-24

## 2022-05-09 ENCOUNTER — OFFICE VISIT (OUTPATIENT)
Dept: ORTHOPEDIC SURGERY | Age: 87
End: 2022-05-09
Payer: MEDICARE

## 2022-05-09 VITALS — BODY MASS INDEX: 16.17 KG/M2 | HEIGHT: 67 IN | WEIGHT: 103 LBS

## 2022-05-09 DIAGNOSIS — Z09 SURGICAL FOLLOW-UP CARE: Primary | ICD-10-CM

## 2022-05-09 PROCEDURE — 99024 POSTOP FOLLOW-UP VISIT: CPT | Performed by: ORTHOPAEDIC SURGERY

## 2022-05-09 NOTE — LETTER
5/9/2022    Patient: Marian Jenkins   YOB: 1935   Date of Visit: 5/9/2022     Sascha Mcpherson, [address] [facility]  [facility] [id number]  Via Fax: [phone]    Dear Sascha Mcpherson MD,      Thank you for referring Ms. Marian Jenkins to [facility] for evaluation. My notes for this consultation are attached. If you have questions, please do not hesitate to call me. I look forward to following your patient along with you.       Sincerely,    Elena Orosco, DO

## 2022-05-09 NOTE — PROGRESS NOTES
Brain Mccloud (: 1935) is a 80 y.o. female, established patient, here for evaluation of the following chief complaint(s):  Hip Pain (po left hip)       ASSESSMENT/PLAN:  Below is the assessment and plan developed based on review of pertinent history, physical exam, labs, studies, and medications. Overall she seems to be healing her fracture well. I did have concerns because she presented with a nosebleed today. I recommended stopping her anticoagulant. She will continue with her therapy regimen at her rehab facility. I will see her back in 2 months. 1. Surgical follow-up care  -     XR HIP LT W OR WO PELV 2-3 VWS; Future      Return in about 2 months (around 2022). SUBJECTIVE/OBJECTIVE:  Brain Mccloud (: 1935) is a 80 y.o. female. She notes some continued aching pain in the left hip. She has been working with therapy at her rehab facility. No Known Allergies    Current Outpatient Medications   Medication Sig    acetaminophen (TYLENOL) 325 mg tablet Take 2 Tablets by mouth every six (6) hours as needed for Pain.  calcium-vitamin D (OS-ARCELIA +D3) 500 mg-200 unit per tablet Take 1 Tablet by mouth daily (with breakfast). No current facility-administered medications for this visit.        Social History     Socioeconomic History    Marital status:      Spouse name: Not on file    Number of children: Not on file    Years of education: Not on file    Highest education level: Not on file   Occupational History    Not on file   Tobacco Use    Smoking status: Never Smoker    Smokeless tobacco: Never Used   Substance and Sexual Activity    Alcohol use: Yes     Comment: occ    Drug use: Not Currently    Sexual activity: Not on file   Other Topics Concern    Not on file   Social History Narrative    Not on file     Social Determinants of Health     Financial Resource Strain:     Difficulty of Paying Living Expenses: Not on file   Food Insecurity:     Worried About Running Out of Food in the Last Year: Not on file    Ran Out of Food in the Last Year: Not on file   Transportation Needs:     Lack of Transportation (Medical): Not on file    Lack of Transportation (Non-Medical): Not on file   Physical Activity:     Days of Exercise per Week: Not on file    Minutes of Exercise per Session: Not on file   Stress:     Feeling of Stress : Not on file   Social Connections:     Frequency of Communication with Friends and Family: Not on file    Frequency of Social Gatherings with Friends and Family: Not on file    Attends [employer] Services: Not on file    Active Member of [address] [place] [address] [employer] or Organizations: Not on file    Attends Club or Organization Meetings: Not on file    Marital Status: Not on file   Intimate Partner Violence:     Fear of Current or Ex-Partner: Not on file    Emotionally Abused: Not on file    Physically Abused: Not on file    Sexually Abused: Not on file   Housing Stability:     Unable to Pay for Housing in the Last Year: Not on file    Number of [place] in the Last Year: Not on file    Unstable Housing in the Last Year: Not on file       Past Surgical History:   Procedure Laterality Date    HX APPENDECTOMY      HX TONSIL AND ADENOIDECTOMY      IR KYPHOPLASTY THORACIC  1/31/2022       No family history on file. OB History    No obstetric history on file. REVIEW OF SYSTEMS:    Patient denies any recent fever, chills, nausea, vomiting, chest pain, or shortness of breath. Vitals:  Ht 5' 7\" (1.702 m)   Wt 103 lb (46.7 kg)   BMI 16.13 kg/m²    Body mass index is 16.13 kg/m². PHYSICAL EXAM:  General exam: Patient is awake, alert, and oriented x3. Well-appearing. No acute distress. She presents in wheelchair    Left hip: Neurovascular and sensory intact. Incisions well-healed with no erythema or drainage. Normal stability. Mild swelling and ecchymosis.             IMAGING:    XR Results (most recent):  Results from Appointment encounter on 05/09/22    XR HIP LT W OR WO PELV 2-3 VWS    Narrative  X-rays of the left hip 3 views done today show evidence of a well-seated cephalomedullary nail with no signs of hardware failure or loosening. Orders Placed This Encounter    XR HIP LT W OR WO PELV 2-3 VWS     Standing Status:   Future     Number of Occurrences:   1     Standing Expiration Date:   5/10/2023              An electronic signature was used to authenticate this note.   -- Vertis Severe, DO

## 2022-05-09 NOTE — PATIENT INSTRUCTIONS
Date of appointment:  5/9/2022     Examining Physician: Nubia Aranda      Employee information    Name:  Sade Lord                                          YOB: 1935                               Medical record number: [id number]    Patient may continue to weight-bear as tolerated on the left lower extremity. Physical therapy and Occupational Therapy to work on range of motion and strengthening. Patient presented to the office with a nosebleed today. Can discontinue her blood thinner from an orthopedic standpoint. Follow-up in 2 months for repeat x-rays.     Signed: Nesha Paredes DO

## 2022-05-18 ENCOUNTER — TELEPHONE (OUTPATIENT)
Dept: ORTHOPEDIC SURGERY | Age: 87
End: 2022-05-18

## 2022-05-18 NOTE — TELEPHONE ENCOUNTER
Renny Verma from [facility] called [phone]  With concerns for patients living situation. shes alone and has been in the same clothes for 4 days , she had another recent nose bleed, louis states she cant prepare her own meals, her son lives in [place], she is contacting a .

## 2023-05-21 RX ORDER — B-COMPLEX WITH VITAMIN C
1 TABLET ORAL
COMMUNITY
Start: 2022-02-07

## 2023-05-21 RX ORDER — ACETAMINOPHEN 325 MG/1
650 TABLET ORAL EVERY 6 HOURS PRN
COMMUNITY
Start: 2022-02-06